# Patient Record
Sex: MALE | Race: BLACK OR AFRICAN AMERICAN | Employment: FULL TIME | URBAN - METROPOLITAN AREA
[De-identification: names, ages, dates, MRNs, and addresses within clinical notes are randomized per-mention and may not be internally consistent; named-entity substitution may affect disease eponyms.]

---

## 2019-04-09 ENCOUNTER — HOSPITAL ENCOUNTER (EMERGENCY)
Age: 59
Discharge: HOME | End: 2019-04-09
Attending: EMERGENCY MEDICINE
Payer: COMMERCIAL

## 2019-04-09 ENCOUNTER — APPOINTMENT (OUTPATIENT)
Dept: GENERAL RADIOLOGY | Age: 59
End: 2019-04-09
Attending: EMERGENCY MEDICINE
Payer: COMMERCIAL

## 2019-04-09 VITALS
OXYGEN SATURATION: 97 % | HEART RATE: 87 BPM | RESPIRATION RATE: 23 BRPM | SYSTOLIC BLOOD PRESSURE: 140 MMHG | TEMPERATURE: 98.9 F | DIASTOLIC BLOOD PRESSURE: 87 MMHG | WEIGHT: 176 LBS

## 2019-04-09 DIAGNOSIS — R05.9 COUGH: Primary | ICD-10-CM

## 2019-04-09 DIAGNOSIS — R52 GENERALIZED BODY ACHES: ICD-10-CM

## 2019-04-09 LAB
ANION GAP SERPL CALC-SCNC: 6 MMOL/L (ref 3–18)
ATRIAL RATE: 76 BPM
BASOPHILS # BLD: 0 K/UL (ref 0–0.1)
BASOPHILS NFR BLD: 0 % (ref 0–2)
BUN SERPL-MCNC: 19 MG/DL (ref 7–18)
BUN/CREAT SERPL: 16 (ref 12–20)
CALCIUM SERPL-MCNC: 9.7 MG/DL (ref 8.5–10.1)
CALCULATED P AXIS, ECG09: 62 DEGREES
CALCULATED R AXIS, ECG10: 43 DEGREES
CALCULATED T AXIS, ECG11: 148 DEGREES
CHLORIDE SERPL-SCNC: 105 MMOL/L (ref 100–108)
CO2 SERPL-SCNC: 27 MMOL/L (ref 21–32)
CREAT SERPL-MCNC: 1.18 MG/DL (ref 0.6–1.3)
DIAGNOSIS, 93000: NORMAL
DIFFERENTIAL METHOD BLD: ABNORMAL
EOSINOPHIL # BLD: 0 K/UL (ref 0–0.4)
EOSINOPHIL NFR BLD: 1 % (ref 0–5)
ERYTHROCYTE [DISTWIDTH] IN BLOOD BY AUTOMATED COUNT: 13.6 % (ref 11.6–14.5)
GLUCOSE SERPL-MCNC: 100 MG/DL (ref 74–99)
HCT VFR BLD AUTO: 45.3 % (ref 36–48)
HGB BLD-MCNC: 15.7 G/DL (ref 13–16)
LYMPHOCYTES # BLD: 2 K/UL (ref 0.9–3.6)
LYMPHOCYTES NFR BLD: 29 % (ref 21–52)
MCH RBC QN AUTO: 32 PG (ref 24–34)
MCHC RBC AUTO-ENTMCNC: 34.7 G/DL (ref 31–37)
MCV RBC AUTO: 92.3 FL (ref 74–97)
MONOCYTES # BLD: 0.9 K/UL (ref 0.05–1.2)
MONOCYTES NFR BLD: 12 % (ref 3–10)
NEUTS SEG # BLD: 4.2 K/UL (ref 1.8–8)
NEUTS SEG NFR BLD: 58 % (ref 40–73)
P-R INTERVAL, ECG05: 130 MS
PLATELET # BLD AUTO: 110 K/UL (ref 135–420)
PMV BLD AUTO: 10 FL (ref 9.2–11.8)
POTASSIUM SERPL-SCNC: 3.7 MMOL/L (ref 3.5–5.5)
Q-T INTERVAL, ECG07: 374 MS
QRS DURATION, ECG06: 100 MS
QTC CALCULATION (BEZET), ECG08: 420 MS
RBC # BLD AUTO: 4.91 M/UL (ref 4.7–5.5)
SODIUM SERPL-SCNC: 138 MMOL/L (ref 136–145)
TROPONIN I SERPL-MCNC: <0.02 NG/ML (ref 0–0.04)
VENTRICULAR RATE, ECG03: 76 BPM
WBC # BLD AUTO: 7.1 K/UL (ref 4.6–13.2)

## 2019-04-09 PROCEDURE — 84484 ASSAY OF TROPONIN QUANT: CPT

## 2019-04-09 PROCEDURE — 99285 EMERGENCY DEPT VISIT HI MDM: CPT

## 2019-04-09 PROCEDURE — 80048 BASIC METABOLIC PNL TOTAL CA: CPT

## 2019-04-09 PROCEDURE — 74011250637 HC RX REV CODE- 250/637: Performed by: EMERGENCY MEDICINE

## 2019-04-09 PROCEDURE — 85025 COMPLETE CBC W/AUTO DIFF WBC: CPT

## 2019-04-09 PROCEDURE — 93005 ELECTROCARDIOGRAM TRACING: CPT

## 2019-04-09 PROCEDURE — 71046 X-RAY EXAM CHEST 2 VIEWS: CPT

## 2019-04-09 RX ORDER — GUAIFENESIN 600 MG/1
600 TABLET, EXTENDED RELEASE ORAL 2 TIMES DAILY
Qty: 20 TAB | Refills: 0 | Status: SHIPPED | OUTPATIENT
Start: 2019-04-09 | End: 2021-02-05 | Stop reason: ALTCHOICE

## 2019-04-09 RX ORDER — IBUPROFEN 600 MG/1
600 TABLET ORAL
Status: COMPLETED | OUTPATIENT
Start: 2019-04-09 | End: 2019-04-09

## 2019-04-09 RX ORDER — CETIRIZINE HCL 10 MG
10 TABLET ORAL DAILY
Qty: 10 TAB | Refills: 0 | Status: SHIPPED | OUTPATIENT
Start: 2019-04-09 | End: 2021-02-05 | Stop reason: ALTCHOICE

## 2019-04-09 RX ORDER — ACETAMINOPHEN 500 MG
1000 TABLET ORAL
Qty: 40 TAB | Refills: 0 | Status: SHIPPED | OUTPATIENT
Start: 2019-04-09 | End: 2022-08-05 | Stop reason: ALTCHOICE

## 2019-04-09 RX ORDER — IBUPROFEN 600 MG/1
600 TABLET ORAL
Qty: 20 TAB | Refills: 0 | Status: ON HOLD | OUTPATIENT
Start: 2019-04-09 | End: 2021-01-14

## 2019-04-09 RX ORDER — ACETAMINOPHEN 500 MG
1000 TABLET ORAL
Status: COMPLETED | OUTPATIENT
Start: 2019-04-09 | End: 2019-04-09

## 2019-04-09 RX ADMIN — IBUPROFEN 600 MG: 600 TABLET ORAL at 02:58

## 2019-04-09 RX ADMIN — ACETAMINOPHEN 1000 MG: 500 TABLET, FILM COATED ORAL at 02:58

## 2019-04-09 NOTE — LETTER
NOTIFICATION RETURN TO WORK / SCHOOL 
 
4/9/2019 3:20 AM 
 
Mr. Luis Mckeon [address] [place] [address] [phone] To Whom It May Concern: 
 
Luis Mckeon is currently under the care of [facility] EMERGENCY DEPT. He will return to work/school on: 4/11/19 If there are questions or concerns please have the patient contact our office. Sincerely, Juan Leong MD

## 2019-04-09 NOTE — ED TRIAGE NOTES
Pt reports cough along with soreness in both arms, chest and back since Saturday.   Pt also reports chills

## 2019-04-09 NOTE — ED PROVIDER NOTES
Asif Treviño is a 60 y.o. Male with history of HIV and undetectable viral load with complaints of not feeling well for the last 2 days with cough congestion body aches. Patient states he has chest soreness mainly with cough. He has no exertional symptoms. There is no nausea vomiting or diarrhea. But does not have a known fever but is having chills and sweats. He denies any recent admissions or significant exposures. He has taken some over-the-counter medication with minimal relief. There is no hemoptysis    The history is provided by the patient. Past Medical History:   Diagnosis Date    HIV (human immunodeficiency virus infection) ([facility] [address]        No past surgical history on file. No family history on file.     Social History     Socioeconomic History    Marital status: SINGLE     Spouse name: Not on file    Number of children: Not on file    Years of education: Not on file    Highest education level: Not on file   Occupational History    Not on file   Social Needs    Financial resource strain: Not on file    Food insecurity:     Worry: Not on file     Inability: Not on file    Transportation needs:     Medical: Not on file     Non-medical: Not on file   Tobacco Use    Smoking status: Never Smoker   Substance and Sexual Activity    Alcohol use: Yes     Comment: socially    Drug use: Not on file    Sexual activity: Not on file   Lifestyle    Physical activity:     Days per week: Not on file     Minutes per session: Not on file    Stress: Not on file   Relationships    Social connections:     Talks on phone: Not on file     Gets together: Not on file     Attends Sikhism service: Not on file     Active member of club or organization: Not on file     Attends meetings of clubs or organizations: Not on file     Relationship status: Not on file    Intimate partner violence:     Fear of current or ex partner: Not on file     Emotionally abused: Not on file     Physically abused: Not on file Forced sexual activity: Not on file   Other Topics Concern    Not on file   Social History Narrative    Not on file         ALLERGIES: Patient has no known allergies. Review of Systems   Constitutional: Positive for chills and fatigue. Negative for fever. HENT: Positive for congestion. Negative for sore throat. Eyes: Negative for visual disturbance. Respiratory: Positive for cough. Cardiovascular: Negative for leg swelling. Gastrointestinal: Negative for abdominal pain. Endocrine: Negative for polyuria. Genitourinary: Negative for difficulty urinating. Musculoskeletal: Positive for myalgias. Negative for gait problem. Skin: Negative for rash. Allergic/Immunologic: Negative for immunocompromised state. Neurological: Negative for syncope. Psychiatric/Behavioral: Positive for sleep disturbance. Vitals:    04/09/19 0230 04/09/19 0245 04/09/19 0300   BP: 140/86 146/81 133/88   Pulse: 94 78 83   Resp: 23 22 23   Temp: 98.9 °F (37.2 °C)     SpO2: 98% 97% 97%   Weight: 79.8 kg (176 lb)              Physical Exam   Constitutional: He is oriented to person, place, and time. He appears well-developed and well-nourished. Non-toxic appearance. He does not have a sickly appearance. He does not appear ill. No distress. HENT:   Head: Normocephalic and atraumatic. Right Ear: External ear normal.   Left Ear: External ear normal.   Nose: Nose normal.   Mouth/Throat: Oropharynx is clear and moist. No oropharyngeal exudate. Eyes: Conjunctivae are normal.   Neck: Normal range of motion. Cardiovascular: Normal rate, regular rhythm, normal heart sounds and intact distal pulses. Pulmonary/Chest: Effort normal and breath sounds normal. No respiratory distress. He exhibits tenderness. Abdominal: Soft. There is no tenderness. Musculoskeletal: Normal range of motion. He exhibits no edema. Neurological: He is alert and oriented to person, place, and time. Skin: Skin is warm and dry.  He is not diaphoretic. Psychiatric: His behavior is normal.   Nursing note and vitals reviewed. MDM       Procedures    Vitals:  Patient Vitals for the past 12 hrs:   Temp Pulse Resp BP SpO2   04/09/19 0300 -- 83 23 133/88 97 %   04/09/19 0245 -- 78 22 146/81 97 %   04/09/19 0230 98.9 °F (37.2 °C) 94 23 140/86 98 %         Medications ordered:   Medications   acetaminophen (TYLENOL) tablet 1,000 mg (1,000 mg Oral Given 4/9/19 0258)   ibuprofen (MOTRIN) tablet 600 mg (600 mg Oral Given 4/9/19 0258)         Lab findings:  Recent Results (from the past 12 hour(s))   EKG, 12 LEAD, INITIAL    Collection Time: 04/09/19  2:42 AM   Result Value Ref Range    Ventricular Rate 76 BPM    Atrial Rate 76 BPM    P-R Interval 130 ms    QRS Duration 100 ms    Q-T Interval 374 ms    QTC Calculation (Bezet) 420 ms    Calculated P Axis 62 degrees    Calculated R Axis 43 degrees    Calculated T Axis 148 degrees    Diagnosis       Normal sinus rhythm  Possible Left atrial enlargement  Incomplete right bundle branch block  Left ventricular hypertrophy  T wave abnormality, consider inferolateral ischemia  Abnormal ECG  No previous ECGs available     CBC WITH AUTOMATED DIFF    Collection Time: 04/09/19  2:49 AM   Result Value Ref Range    WBC 7.1 4.6 - 13.2 K/uL    RBC 4.91 4.70 - 5.50 M/uL    HGB 15.7 13.0 - 16.0 g/dL    HCT 45.3 36.0 - 48.0 %    MCV 92.3 74.0 - 97.0 FL    MCH 32.0 24.0 - 34.0 PG    MCHC 34.7 31.0 - 37.0 g/dL    RDW 13.6 11.6 - 14.5 %    PLATELET 304 (L) 288 - 420 K/uL    MPV 10.0 9.2 - 11.8 FL    NEUTROPHILS 58 40 - 73 %    LYMPHOCYTES 29 21 - 52 %    MONOCYTES 12 (H) 3 - 10 %    EOSINOPHILS 1 0 - 5 %    BASOPHILS 0 0 - 2 %    ABS. NEUTROPHILS 4.2 1.8 - 8.0 K/UL    ABS. LYMPHOCYTES 2.0 0.9 - 3.6 K/UL    ABS. MONOCYTES 0.9 0.05 - 1.2 K/UL    ABS. EOSINOPHILS 0.0 0.0 - 0.4 K/UL    ABS.  BASOPHILS 0.0 0.0 - 0.1 K/UL    DF AUTOMATED     METABOLIC PANEL, BASIC    Collection Time: 04/09/19  2:49 AM   Result Value Ref Range Sodium 138 136 - 145 mmol/L    Potassium 3.7 3.5 - 5.5 mmol/L    Chloride 105 100 - 108 mmol/L    CO2 27 21 - 32 mmol/L    Anion gap 6 3.0 - 18 mmol/L    Glucose 100 (H) 74 - 99 mg/dL    BUN 19 (H) 7.0 - 18 MG/DL    Creatinine 1.18 0.6 - 1.3 MG/DL    BUN/Creatinine ratio 16 12 - 20      GFR est AA >60 >60 ml/min/1.73m2    GFR est non-AA >60 >60 ml/min/1.73m2    Calcium 9.7 8.5 - 10.1 MG/DL   TROPONIN I    Collection Time: 04/09/19  2:49 AM   Result Value Ref Range    Troponin-I, QT <0.02 0.0 - 0.045 NG/ML       EKG interpretation by ED Physician:  Normal sinus rhythm with incomplete right bundle branch block. Left ventricular hypertrophy. T wave abnormality in inferior leads. Rate 76    Not specifically different from other local facility    X-Ray, CT or other radiology findings or impressions:  XR CHEST PA LAT    (Results Pending)   nap per my interp    Progress notes, Consult notes or additional Procedure notes:   Most consistent with viral process. Do not feel patient requires other workup at this time  I have discussed with patient and/or family/sig other the results, interpretation of any imaging if performed, suspected diagnosis and treatment plan to include instructions regarding the diagnoses listed to which understanding was expressed with all questions answered      Reevaluation of patient:   stable    Disposition:  Diagnosis:   1. Cough    2.  Generalized body aches        Disposition: home    Follow-up Information     Follow up With Specialties Details Why Contact Samuel Mathis MD Internal Medicine Schedule an appointment as soon as possible for a visit  [address] EMERGENCY DEPT Emergency Medicine  If symptoms worsen [address] [place] [address]  [phone]            Patient's Medications   Start Taking    ACETAMINOPHEN (TYLENOL EXTRA STRENGTH) 500 MG TABLET    Take 2 Tabs by mouth every six (6) hours as needed for Pain or Fever. CETIRIZINE (ZYRTEC) 10 MG TABLET    Take 1 Tab by mouth daily. GUAIFENESIN ER (MUCINEX) 600 MG ER TABLET    Take 1 Tab by mouth two (2) times a day. IBUPROFEN (MOTRIN) 600 MG TABLET    Take 1 Tab by mouth every six (6) hours as needed for Pain. Continue Taking    ATAZANAVIR (REYATAZ) 300 MG CAPSULE    Take 600 mg by mouth Daily (before breakfast). EMTRICITABINE (EMTRIVA) 200 MG CAPSULE    Take 200 mg by mouth daily. ROSUVASTATIN (CRESTOR) 10 MG TABLET    Take 10 mg by mouth nightly. STAVUDINE PO    Take 50 mg by mouth two (2) times a day. These Medications have changed    No medications on file   Stop Taking    GUAIFENESIN-CODEINE (ROBITUSSIN AC) 100-10 MG/5 ML SOLUTION    Take 5 mL by mouth nightly as needed for Cough. Max Daily Amount: 5 mL.

## 2019-04-09 NOTE — DISCHARGE INSTRUCTIONS
Patient Education        Cough: Care Instructions  Your Care Instructions    A cough is your body's response to something that bothers your throat or airways. Many things can cause a cough. You might cough because of a cold or the flu, bronchitis, or asthma. Smoking, postnasal drip, allergies, and stomach acid that backs up into your throat also can cause coughs. A cough is a symptom, not a disease. Most coughs stop when the cause, such as a cold, goes away. You can take a few steps at home to cough less and feel better. Follow-up care is a key part of your treatment and safety. Be sure to make and go to all appointments, and call your doctor if you are having problems. It's also a good idea to know your test results and keep a list of the medicines you take. How can you care for yourself at home? · Drink lots of water and other fluids. This helps thin the mucus and soothes a dry or sore throat. Honey or lemon juice in hot water or tea may ease a dry cough. · Take cough medicine as directed by your doctor. · Prop up your head on pillows to help you breathe and ease a dry cough. · Try cough drops to soothe a dry or sore throat. Cough drops don't stop a cough. Medicine-flavored cough drops are no better than candy-flavored drops or hard candy. · Do not smoke. Avoid secondhand smoke. If you need help quitting, talk to your doctor about stop-smoking programs and medicines. These can increase your chances of quitting for good. When should you call for help? Call 911 anytime you think you may need emergency care.  For example, call if:    · You have severe trouble breathing.    Call your doctor now or seek immediate medical care if:    · You cough up blood.     · You have new or worse trouble breathing.     · You have a new or higher fever.     · You have a new rash.    Watch closely for changes in your health, and be sure to contact your doctor if:    · You cough more deeply or more often, especially if you notice more mucus or a change in the color of your mucus.     · You have new symptoms, such as a sore throat, an earache, or sinus pain.     · You do not get better as expected. Where can you learn more? Go to http://arsenio-mira.info/. Enter D279 in the search box to learn more about \"Cough: Care Instructions. \"  Current as of: September 5, 2018  Content Version: 11.9  © 9211-4059 [employer]. Care instructions adapted under license by [employer] (which disclaims liability or warranty for this information). If you have questions about a medical condition or this instruction, always ask your healthcare professional. [address] any warranty or liability for your use of this information.

## 2020-10-11 ENCOUNTER — HOSPITAL ENCOUNTER (EMERGENCY)
Age: 60
Discharge: HOME | End: 2020-10-11
Attending: EMERGENCY MEDICINE
Payer: COMMERCIAL

## 2020-10-11 VITALS
HEART RATE: 60 BPM | BODY MASS INDEX: 25.33 KG/M2 | SYSTOLIC BLOOD PRESSURE: 133 MMHG | HEIGHT: 69 IN | RESPIRATION RATE: 12 BRPM | OXYGEN SATURATION: 97 % | DIASTOLIC BLOOD PRESSURE: 83 MMHG | WEIGHT: 171 LBS | TEMPERATURE: 98.1 F

## 2020-10-11 DIAGNOSIS — S00.81XA ABRASION OF FOREHEAD, INITIAL ENCOUNTER: ICD-10-CM
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
## 2020-10-11 PROCEDURE — 74011000250 HC RX REV CODE- 250: Performed by: PHYSICIAN ASSISTANT

## 2020-10-11 PROCEDURE — 90715 TDAP VACCINE 7 YRS/> IM: CPT | Performed by: PHYSICIAN ASSISTANT

## 2020-10-11 PROCEDURE — 90471 IMMUNIZATION ADMIN: CPT

## 2020-10-11 PROCEDURE — 74011250636 HC RX REV CODE- 250/636: Performed by: PHYSICIAN ASSISTANT

## 2020-10-11 PROCEDURE — 99283 EMERGENCY DEPT VISIT LOW MDM: CPT

## 2020-10-11 RX ORDER — ASPIRIN 81 MG/1
TABLET ORAL DAILY
COMMUNITY

## 2020-10-11 RX ADMIN — TETANUS TOXOID, REDUCED DIPHTHERIA TOXOID AND ACELLULAR PERTUSSIS VACCINE, ADSORBED 0.5 ML: 5; 2.5; 8; 8; 2.5 SUSPENSION INTRAMUSCULAR at 20:07

## 2020-10-11 RX ADMIN — BACITRACIN, NEOMYCIN, POLYMYXIN B 1 PACKET: 400; 3.5; 5 OINTMENT TOPICAL at 20:07

## 2020-10-11 NOTE — ED TRIAGE NOTES
Ambulatory to triage. Patient states he slipped and fell down 2 steps outside 3 hours ago, hit the front of his head, right elbow and right knee. Arrives with 2 abrasions to his forehead. Denies LOC. \"I was just worried because of the swelling on my head. \"

## 2020-10-11 NOTE — ED PROVIDER NOTES
EMERGENCY DEPARTMENT HISTORY AND PHYSICAL EXAM    Date: 10/11/2020  Patient Name: Chalino Kirk    History of Presenting Illness     Chief Complaint   Patient presents with   [facility] Fall         History Provided By: patient        Additional History (Context): Chalino Kirk is a 9year-old male with history of HIV presenting to the emergency department with forehead abrasion he sustained prior to arrival.  Patient states he was pushing a family member in a wheelchair, lost his balance and fell forward. States he braces were head on what he thinks is the back of the wheelchair as he did not fully hit the ground. Patient did not lose consciousness, denies symptoms of headache, dizziness, neck pain, change in vision, dizziness, weakness, back pain or neck pain. Patient states he is in the ED as he is unsure when his last tetanus shot was. Patient states he is compliant with all HIV medications and his levels are undetectable. Patient denies anticoagulation or alcohol use today. PCP: Dinesh Lucas MD    Current Facility-Administered Medications   Medication Dose Route Frequency Provider Last Rate Last Dose    diph,Pertuss(AC),Tet Vac-PF (BOOSTRIX) suspension 0.5 mL  0.5 mL IntraMUSCular PRIOR TO DISCHARGE Jenelle Crespo PA-C        neomycin-bacitracnZn-polymyxnB (NEOSPORIN) ointment 1 Packet  1 Packet Topical NOW [place], [place]         Current Outpatient Medications   Medication Sig Dispense Refill    aspirin delayed-release 81 mg tablet Take  by mouth daily.  emtricitabine (EMTRIVA) 200 mg capsule Take 200 mg by mouth daily.  guaiFENesin ER (MUCINEX) 600 mg ER tablet Take 1 Tab by mouth two (2) times a day. 20 Tab 0    cetirizine (ZYRTEC) 10 mg tablet Take 1 Tab by mouth daily. 10 Tab 0    acetaminophen (TYLENOL EXTRA STRENGTH) 500 mg tablet Take 2 Tabs by mouth every six (6) hours as needed for Pain or Fever.  40 Tab 0    ibuprofen (MOTRIN) 600 mg tablet Take 1 Tab by mouth every six (6) hours as needed for Pain. 20 Tab 0    STAVUDINE PO Take 50 mg by mouth two (2) times a day.  atazanavir (REYATAZ) 300 mg capsule Take 600 mg by mouth Daily (before breakfast).  rosuvastatin (CRESTOR) 10 mg tablet Take 10 mg by mouth nightly. Past History     Past Medical History:  Past Medical History:   Diagnosis Date    High cholesterol     HIV (human immunodeficiency virus infection) ([facility] [address]        Past Surgical History:  History reviewed. No pertinent surgical history. Family History:  History reviewed. No pertinent family history. Social History:  Social History     Tobacco Use    Smoking status: Never Smoker   Substance Use Topics    Alcohol use: Yes     Comment: socially    Drug use: Not on file       Allergies:  No Known Allergies      Review of Systems       Review of Systems   Constitutional: Negative for chills and fever. HENT: Negative for nasal congestion, sore throat, rhinorrhea  Eyes: Negative. Respiratory: Negative for cough and negative for shortness of breath. Cardiovascular: Negative for chest pain and palpitations. Gastrointestinal: Negative for abdominal pain, constipation, diarrhea, nausea and vomiting. Genitourinary: Negative. Negative for difficulty urinating and flank pain. Musculoskeletal: Negative for back pain. Negative for gait problem and neck pain. Skin: pos for forehead abrasion. Allergic/Immunologic: Negative. Neurological: Negative for dizziness, weakness, numbness and headaches. Psychiatric/Behavioral: Negative. All other systems reviewed and are negative. All Other Systems Negative  Physical Exam     Vitals:    10/11/20 1939   BP: 133/83   Pulse: 60   Resp: 12   Temp: 98.1 °F (36.7 °C)   SpO2: 97%   Weight: 77.6 kg (171 lb)   Height: 5' 9\" (1.753 m)     Physical Exam  Vitals signs and nursing note reviewed. Constitutional:       General: He is not in acute distress. Appearance: Normal appearance. He is well-developed. He is not diaphoretic. HENT:      Head: Normocephalic. Abrasion present. No raccoon eyes, Zimmerman's sign, contusion, masses, right periorbital erythema, left periorbital erythema or laceration. Hair is normal.      Jaw: There is normal jaw occlusion. Nose: Nose normal.   Eyes:      Conjunctiva/sclera: Conjunctivae normal.      Pupils: Pupils are equal, round, and reactive to light. Neck:      Musculoskeletal: Normal range of motion and neck supple. No neck rigidity or muscular tenderness. Vascular: No carotid bruit. Cardiovascular:      Rate and Rhythm: Normal rate and regular rhythm. Pulmonary:      Effort: Pulmonary effort is normal. No respiratory distress. Breath sounds: Normal breath sounds. Abdominal:      Palpations: Abdomen is soft. Musculoskeletal: Normal range of motion. Skin:     General: Skin is warm. Findings: No rash. Neurological:      Mental Status: He is alert and oriented to person, place, and time. Cranial Nerves: No cranial nerve deficit. Coordination: Coordination normal.   Psychiatric:         Behavior: Behavior normal.           Diagnostic Study Results     Labs -   No results found for this or any previous visit (from the past 12 hour(s)). Radiologic Studies -   No orders to display     CT Results  (Last 48 hours)    None        CXR Results  (Last 48 hours)    None            Medical Decision Making   I am the first provider for this patient. I reviewed the vital signs, available nursing notes, past medical history, past surgical history, family history and social history. Vital Signs-Reviewed the patient's vital signs. Records Reviewed: Nursing notes, old medical records and any previous labs, imaging, visits, consultations pertinent to patient care    Procedures:  Procedures      ED Course: Progress Notes, Reevaluation, and Consults:      Provider Notes (Medical Decision Making): Wound cleaned thoroughly by ED nurse.  abx ointment applied. No LOC, focal deficits noted to warrant CT of brain. No c-spine tenderness, FROM. Will update tetanus, recommend ABX ointment and d/c home    MED RECONCILIATION:  Current Facility-Administered Medications   Medication Dose Route Frequency    diph,Pertuss(AC),Tet Vac-PF (BOOSTRIX) suspension 0.5 mL  0.5 mL IntraMUSCular PRIOR TO DISCHARGE    neomycin-bacitracnZn-polymyxnB (NEOSPORIN) ointment 1 Packet  1 Packet Topical NOW     Current Outpatient Medications   Medication Sig    aspirin delayed-release 81 mg tablet Take  by mouth daily.  emtricitabine (EMTRIVA) 200 mg capsule Take 200 mg by mouth daily.  guaiFENesin ER (MUCINEX) 600 mg ER tablet Take 1 Tab by mouth two (2) times a day.  cetirizine (ZYRTEC) 10 mg tablet Take 1 Tab by mouth daily.  acetaminophen (TYLENOL EXTRA STRENGTH) 500 mg tablet Take 2 Tabs by mouth every six (6) hours as needed for Pain or Fever.  ibuprofen (MOTRIN) 600 mg tablet Take 1 Tab by mouth every six (6) hours as needed for Pain.  STAVUDINE PO Take 50 mg by mouth two (2) times a day.  atazanavir (REYATAZ) 300 mg capsule Take 600 mg by mouth Daily (before breakfast).  rosuvastatin (CRESTOR) 10 mg tablet Take 10 mg by mouth nightly. Disposition:  home    DISCHARGE NOTE:     Pt has been reexamined. Patient has no new complaints, changes, or physical findings. Care plan outlined and precautions discussed. Discussed proper way to take medications. Discussed treatment plan, return precautions, symptomatic relief, and expected time to improvement. All questions answered. Patient is stable for discharge and outpatient management. Patient is ready to go home.     Follow-up Information     Follow up With Specialties Details Why Contact Info    Kamila Kraft MD Internal Medicine   [address] [facility] [address] [phone]      [facility] EMERGENCY DEPT Emergency Medicine   [address]  [address] [place] [address]  [phone] Current Discharge Medication List                Diagnosis     Clinical Impression:   1. Fall, initial encounter    2. Abrasion of forehead, initial encounter            Dictation disclaimer:  Please note that this dictation was completed with [employer], the computer voice recognition software. Quite often unanticipated grammatical, syntax, homophones, and other interpretive errors are inadvertently transcribed by the computer software. Please disregard these errors. Please excuse any errors that have escaped final proofreading.

## 2020-10-12 NOTE — ED NOTES
Abrasions cleaned with saline and shur-cleanse. Bacitracin and sterile non-adhesive applied. Patient tolerated well. Lowell Moralez

## 2021-01-13 ENCOUNTER — HOSPITAL ENCOUNTER (INPATIENT)
Age: 61
LOS: 7 days | Discharge: HOME | DRG: 371 | End: 2021-01-20
Attending: EMERGENCY MEDICINE | Admitting: INTERNAL MEDICINE
Payer: COMMERCIAL

## 2021-01-13 ENCOUNTER — APPOINTMENT (OUTPATIENT)
Dept: GENERAL RADIOLOGY | Age: 61
DRG: 371 | End: 2021-01-13
Attending: HOSPITALIST
Payer: COMMERCIAL

## 2021-01-13 ENCOUNTER — APPOINTMENT (OUTPATIENT)
Dept: CT IMAGING | Age: 61
DRG: 371 | End: 2021-01-13
Attending: PHYSICIAN ASSISTANT
Payer: COMMERCIAL

## 2021-01-13 DIAGNOSIS — M79.2 NEUROPATHIC PAIN: ICD-10-CM

## 2021-01-13 DIAGNOSIS — M62.82 NON-TRAUMATIC RHABDOMYOLYSIS: ICD-10-CM

## 2021-01-13 DIAGNOSIS — N17.9 ACUTE RENAL FAILURE, UNSPECIFIED ACUTE RENAL FAILURE TYPE (HCC): Primary | ICD-10-CM

## 2021-01-13 DIAGNOSIS — R77.8 ELEVATED TROPONIN: ICD-10-CM

## 2021-01-13 LAB
AMORPH CRY URNS QL MICRO: ABNORMAL
ANION GAP SERPL CALC-SCNC: 8 MMOL/L (ref 3–18)
APPEARANCE UR: ABNORMAL
BACTERIA URNS QL MICRO: NEGATIVE /HPF
BASOPHILS # BLD: 0 K/UL (ref 0–0.1)
BASOPHILS NFR BLD: 0 % (ref 0–2)
BILIRUB UR QL: NEGATIVE
BUN SERPL-MCNC: 35 MG/DL (ref 7–18)
BUN/CREAT SERPL: 15 (ref 12–20)
CALCIUM SERPL-MCNC: 10.6 MG/DL (ref 8.5–10.1)
CHLORIDE SERPL-SCNC: 99 MMOL/L (ref 100–111)
CK MB CFR SERPL CALC: 0.1 % (ref 0–4)
CK MB SERPL-MCNC: 12.9 NG/ML (ref 5–25)
CK SERPL-CCNC: ABNORMAL U/L (ref 39–308)
CO2 SERPL-SCNC: 27 MMOL/L (ref 21–32)
COLOR UR: ABNORMAL
CREAT SERPL-MCNC: 2.41 MG/DL (ref 0.6–1.3)
DIFFERENTIAL METHOD BLD: ABNORMAL
EOSINOPHIL # BLD: 0 K/UL (ref 0–0.4)
EOSINOPHIL NFR BLD: 0 % (ref 0–5)
EPITH CASTS URNS QL MICRO: ABNORMAL /LPF (ref 0–5)
ERYTHROCYTE [DISTWIDTH] IN BLOOD BY AUTOMATED COUNT: 13.4 % (ref 11.6–14.5)
GLUCOSE SERPL-MCNC: 134 MG/DL (ref 74–99)
GLUCOSE UR STRIP.AUTO-MCNC: NEGATIVE MG/DL
GRAN CASTS URNS QL MICRO: ABNORMAL /LPF
HCT VFR BLD AUTO: 52.5 % (ref 36–48)
HGB BLD-MCNC: 18 G/DL (ref 13–16)
HGB UR QL STRIP: ABNORMAL
KETONES UR QL STRIP.AUTO: NEGATIVE MG/DL
LEUKOCYTE ESTERASE UR QL STRIP.AUTO: ABNORMAL
LYMPHOCYTES # BLD: 1.6 K/UL (ref 0.9–3.6)
LYMPHOCYTES NFR BLD: 31 % (ref 21–52)
MAGNESIUM SERPL-MCNC: 2 MG/DL (ref 1.6–2.6)
MCH RBC QN AUTO: 32.1 PG (ref 24–34)
MCHC RBC AUTO-ENTMCNC: 34.3 G/DL (ref 31–37)
MCV RBC AUTO: 93.6 FL (ref 74–97)
METAMYELOCYTES NFR BLD MANUAL: 3 %
MONOCYTES # BLD: 0.5 K/UL (ref 0.05–1.2)
MONOCYTES NFR BLD: 10 % (ref 3–10)
NEUTS BAND NFR BLD MANUAL: 15 %
NEUTS SEG # BLD: 2.1 K/UL (ref 1.8–8)
NEUTS SEG NFR BLD: 41 % (ref 40–73)
NITRITE UR QL STRIP.AUTO: NEGATIVE
PH UR STRIP: 5 [PH] (ref 5–8)
PHOSPHATE SERPL-MCNC: 3.2 MG/DL (ref 2.5–4.9)
PLATELET # BLD AUTO: 130 K/UL (ref 135–420)
PLATELET COMMENTS,PCOM: ABNORMAL
PMV BLD AUTO: 10.9 FL (ref 9.2–11.8)
POTASSIUM SERPL-SCNC: 3.4 MMOL/L (ref 3.5–5.5)
PROT UR STRIP-MCNC: 300 MG/DL
RBC # BLD AUTO: 5.61 M/UL (ref 4.7–5.5)
RBC #/AREA URNS HPF: ABNORMAL /HPF (ref 0–5)
RBC MORPH BLD: ABNORMAL
SODIUM SERPL-SCNC: 134 MMOL/L (ref 136–145)
SP GR UR REFRACTOMETRY: 1.01 (ref 1–1.03)
TROPONIN I SERPL-MCNC: 0.16 NG/ML (ref 0–0.04)
UROBILINOGEN UR QL STRIP.AUTO: 0.2 EU/DL (ref 0.2–1)
WBC # BLD AUTO: 5 K/UL (ref 4.6–13.2)
WBC URNS QL MICRO: ABNORMAL /HPF (ref 0–4)

## 2021-01-13 PROCEDURE — 71045 X-RAY EXAM CHEST 1 VIEW: CPT

## 2021-01-13 PROCEDURE — 82553 CREATINE MB FRACTION: CPT

## 2021-01-13 PROCEDURE — 74011250636 HC RX REV CODE- 250/636: Performed by: INTERNAL MEDICINE

## 2021-01-13 PROCEDURE — 74011250637 HC RX REV CODE- 250/637: Performed by: PHYSICIAN ASSISTANT

## 2021-01-13 PROCEDURE — 99283 EMERGENCY DEPT VISIT LOW MDM: CPT

## 2021-01-13 PROCEDURE — 74011250636 HC RX REV CODE- 250/636: Performed by: EMERGENCY MEDICINE

## 2021-01-13 PROCEDURE — 74011250637 HC RX REV CODE- 250/637: Performed by: INTERNAL MEDICINE

## 2021-01-13 PROCEDURE — 85025 COMPLETE CBC W/AUTO DIFF WBC: CPT

## 2021-01-13 PROCEDURE — 97165 OT EVAL LOW COMPLEX 30 MIN: CPT

## 2021-01-13 PROCEDURE — 2709999900 HC NON-CHARGEABLE SUPPLY

## 2021-01-13 PROCEDURE — 83735 ASSAY OF MAGNESIUM: CPT

## 2021-01-13 PROCEDURE — 87324 CLOSTRIDIUM AG IA: CPT

## 2021-01-13 PROCEDURE — 81001 URINALYSIS AUTO W/SCOPE: CPT

## 2021-01-13 PROCEDURE — 93005 ELECTROCARDIOGRAM TRACING: CPT

## 2021-01-13 PROCEDURE — 65270000029 HC RM PRIVATE

## 2021-01-13 PROCEDURE — 74011250636 HC RX REV CODE- 250/636: Performed by: PHYSICIAN ASSISTANT

## 2021-01-13 PROCEDURE — 74011000250 HC RX REV CODE- 250: Performed by: INTERNAL MEDICINE

## 2021-01-13 PROCEDURE — 87086 URINE CULTURE/COLONY COUNT: CPT

## 2021-01-13 PROCEDURE — 74176 CT ABD & PELVIS W/O CONTRAST: CPT

## 2021-01-13 PROCEDURE — 84100 ASSAY OF PHOSPHORUS: CPT

## 2021-01-13 PROCEDURE — 80048 BASIC METABOLIC PNL TOTAL CA: CPT

## 2021-01-13 RX ORDER — ENOXAPARIN SODIUM 100 MG/ML
40 INJECTION SUBCUTANEOUS DAILY
Status: DISCONTINUED | OUTPATIENT
Start: 2021-01-14 | End: 2021-01-14 | Stop reason: DRUGHIGH

## 2021-01-13 RX ORDER — ASPIRIN 81 MG/1
81 TABLET ORAL DAILY
Status: DISCONTINUED | OUTPATIENT
Start: 2021-01-14 | End: 2021-01-20 | Stop reason: HOSPADM

## 2021-01-13 RX ORDER — SODIUM CHLORIDE 0.9 % (FLUSH) 0.9 %
5-40 SYRINGE (ML) INJECTION EVERY 8 HOURS
Status: DISCONTINUED | OUTPATIENT
Start: 2021-01-13 | End: 2021-01-20 | Stop reason: HOSPADM

## 2021-01-13 RX ORDER — ACETAMINOPHEN 650 MG/1
650 SUPPOSITORY RECTAL
Status: DISCONTINUED | OUTPATIENT
Start: 2021-01-13 | End: 2021-01-20 | Stop reason: HOSPADM

## 2021-01-13 RX ORDER — ONDANSETRON 2 MG/ML
4 INJECTION INTRAMUSCULAR; INTRAVENOUS
Status: DISCONTINUED | OUTPATIENT
Start: 2021-01-13 | End: 2021-01-20 | Stop reason: HOSPADM

## 2021-01-13 RX ORDER — ATAZANAVIR 200 MG/1
600 CAPSULE ORAL
Status: DISCONTINUED | OUTPATIENT
Start: 2021-01-14 | End: 2021-01-14

## 2021-01-13 RX ORDER — POLYETHYLENE GLYCOL 3350 17 G/17G
17 POWDER, FOR SOLUTION ORAL DAILY PRN
Status: DISCONTINUED | OUTPATIENT
Start: 2021-01-13 | End: 2021-01-20 | Stop reason: HOSPADM

## 2021-01-13 RX ORDER — SODIUM CHLORIDE 0.9 % (FLUSH) 0.9 %
5-40 SYRINGE (ML) INJECTION AS NEEDED
Status: DISCONTINUED | OUTPATIENT
Start: 2021-01-13 | End: 2021-01-20 | Stop reason: HOSPADM

## 2021-01-13 RX ORDER — LOPERAMIDE HYDROCHLORIDE 2 MG/1
2 CAPSULE ORAL
Status: COMPLETED | OUTPATIENT
Start: 2021-01-13 | End: 2021-01-13

## 2021-01-13 RX ORDER — SODIUM CHLORIDE 9 MG/ML
1000 INJECTION, SOLUTION INTRAVENOUS CONTINUOUS
Status: DISPENSED | OUTPATIENT
Start: 2021-01-14 | End: 2021-01-14

## 2021-01-13 RX ORDER — LEVOFLOXACIN 5 MG/ML
500 INJECTION, SOLUTION INTRAVENOUS EVERY 24 HOURS
Status: DISCONTINUED | OUTPATIENT
Start: 2021-01-14 | End: 2021-01-14

## 2021-01-13 RX ORDER — ACETAMINOPHEN 325 MG/1
650 TABLET ORAL
Status: DISCONTINUED | OUTPATIENT
Start: 2021-01-13 | End: 2021-01-20 | Stop reason: HOSPADM

## 2021-01-13 RX ORDER — PROMETHAZINE HYDROCHLORIDE 25 MG/1
12.5 TABLET ORAL
Status: DISCONTINUED | OUTPATIENT
Start: 2021-01-13 | End: 2021-01-20 | Stop reason: HOSPADM

## 2021-01-13 RX ORDER — EMTRICITABINE 200 MG/1
200 CAPSULE ORAL DAILY
Status: DISCONTINUED | OUTPATIENT
Start: 2021-01-14 | End: 2021-01-14

## 2021-01-13 RX ADMIN — ACETAMINOPHEN 650 MG: 325 TABLET, FILM COATED ORAL at 23:12

## 2021-01-13 RX ADMIN — SODIUM CHLORIDE 1000 ML: 900 INJECTION, SOLUTION INTRAVENOUS at 17:29

## 2021-01-13 RX ADMIN — LOPERAMIDE HYDROCHLORIDE 2 MG: 2 CAPSULE ORAL at 17:28

## 2021-01-13 RX ADMIN — Medication 10 ML: at 21:02

## 2021-01-13 RX ADMIN — SODIUM BICARBONATE: 84 INJECTION, SOLUTION INTRAVENOUS at 20:02

## 2021-01-13 NOTE — H&P
Hospitalist Admission Note    NAME: Lonnie Smith   :  1960   MRN:  [id number]     Date/Time:  2021 6:27 PM    Patient PCP: Howard Garcia MD  ______________________________________________________________________  Given the patient's current clinical presentation, I have a high level of concern for decompensation if discharged from the emergency department. Complex decision making was performed, which includes reviewing the patient's available past medical records, laboratory results, and x-ray films. My assessment of this patient's clinical condition and my plan of care is as follows. Assessment / Plan:    Rhadomyolysis  - unclear cause at this time, severe diarrhea and dehydration  - admit, for aggressive ivf to prevent worsening deonte  - start bicarb drip to prevent pigment nephropathy   - trend cpk daily    Acute Kidney Injury  - fluids as above, avoid nephrotoxins  - trend cr  - if not improving by am, nephrology consult    HIV  - says well controlled  - cont haart    Diarrhea  - check for cdif  - if neg can use imodium  - ivf to replace losses, watch electrolytes      Subjective:   CHIEF COMPLAINT: diarrhea and weakness    HISTORY OF PRESENT ILLNESS:     Lonnie Smith is a 63 y.o.  male who presents with diarrhea and weakness. He has a hx hiv with compliance with haart. Was in usual state of health until Monday evening, started diarrhea, watery, severe. Pt has continued with this for last 2 days, also occasional vomiting, thought this was food poisoning. He is found to have rhabdo in ed, with deonte. Pt denies being down on floor, denies alcoholism, no new meds, no injury/trauma, no harsh workouts or marathons, no cocaine. He continues to have diarrhea. We were asked to admit for work up and evaluation of the above problems.      Past Medical History:   Diagnosis Date    High cholesterol     HIV (human immunodeficiency virus infection) ([address]         History reviewed. No pertinent surgical history. Social History     Tobacco Use    Smoking status: Never Smoker   Substance Use Topics    Alcohol use: Yes     Comment: socially        History reviewed. No pertinent family history. No Known Allergies     Prior to Admission medications    Medication Sig Start Date End Date Taking? Authorizing Provider   aspirin delayed-release 81 mg tablet Take  by mouth daily. Danielito Pierce MD   guaiFENesin ER (MUCINEX) 600 mg ER tablet Take 1 Tab by mouth two (2) times a day. 4/9/19   Prince Santiago MD   cetirizine (ZYRTEC) 10 mg tablet Take 1 Tab by mouth daily. 4/9/19   Prince Santiago MD   acetaminophen (TYLENOL EXTRA STRENGTH) 500 mg tablet Take 2 Tabs by mouth every six (6) hours as needed for Pain or Fever. 4/9/19   Prince Santiago MD   ibuprofen (MOTRIN) 600 mg tablet Take 1 Tab by mouth every six (6) hours as needed for Pain. 4/9/19   Prince Santiago MD   STAVUDINE PO Take 50 mg by mouth two (2) times a day. Danielito Pierce MD   emtricitabine (EMTRIVA) 200 mg capsule Take 200 mg by mouth daily. Danielito Pierce MD   atazanavir (REYATAZ) 300 mg capsule Take 600 mg by mouth Daily (before breakfast). Danielito Pierce MD   rosuvastatin (CRESTOR) 10 mg tablet Take 10 mg by mouth nightly. Danielito Pierce MD       REVIEW OF SYSTEMS:     I am not able to complete the review of systems because:    The patient is intubated and sedated    The patient has altered mental status due to his acute medical problems    The patient has baseline aphasia from prior stroke(s)    The patient has baseline dementia and is not reliable historian    The patient is in acute medical distress and unable to provide information           Total of 12 systems reviewed as follows:       POSITIVE= underlined text  Negative = text not underlined  General:  generalized weakness,  Eyes:    blurred vision, eye pain, loss of vision, double vision  ENT:    rhinorrhea, pharyngitis   Respiratory:   cough, sputum production, SOB, REED, wheezing, pleuritic pain   Cardiology:   chest pain, palpitations, orthopnea, PND, edema, syncope   Gastrointestinal:  Diarrhea, vomiting  Genitourinary:  frequency, urgency, dysuria, hematuria, incontinence   Muskuloskeletal :  arthralgia, myalgia, back pain  Hematology:  easy bruising, nose or gum bleeding, lymphadenopathy   Dermatological: rash, ulceration, pruritis, color change / jaundice  Endocrine:   hot flashes or polydipsia   Neurological:  headache, dizziness, confusion, focal weakness, paresthesia,     Speech difficulties, memory loss, gait difficulty  Psychological: Feelings of anxiety, depression, agitation    Objective:   VITALS:    Visit Vitals  BP (!) 119/90   Pulse 100   Temp 98 °F (36.7 °C)   Resp 18   SpO2 100%       PHYSICAL EXAM:    General:    Alert, cooperative, mild distress, appears stated age. HEENT: Atraumatic, anicteric sclerae, pink conjunctivae     No oral ulcers, mucosa moist, throat clear, dentition fair  Neck:  Supple, symmetrical,  thyroid: non tender  Lungs:   Clear to auscultation bilaterally. No Wheezing or Rhonchi. No rales. Chest wall:  No tenderness  No Accessory muscle use. Heart:   Regular  rhythm,  No  murmur   No edema  Abdomen:   Soft, non-tender. Not distended. Bowel sounds normal  Extremities: No cyanosis. No clubbing,  No muscle tenderness    Skin turgor normal, Capillary refill normal, Radial dial pulse 2+  Skin:     Not pale. Not Jaundiced  No rashes   Psych:  Good insight. Not depressed. Not anxious or agitated. Neurologic: EOMs intact. No facial asymmetry. No aphasia or slurred speech. Symmetrical strength, Sensation grossly intact.  Alert and oriented X 4.     ecg reviewed, with little change from prior, t waves remain inverted, rBBB.  _______________________________________________________________________  Care Plan discussed with:    Comments   Patient x    Family      RN     Care Manager                    Consultant: _______________________________________________________________________  Expected  Disposition:   Home with Family x   HH/PT/OT/RN    SNF/LTC    MARIA ELENA    ________________________________________________________________________  TOTAL TIME:  35 Minutes    Critical Care Provided     Minutes non procedure based      Comments     Reviewed previous records   >50% of visit spent in counseling and coordination of care  Discussion with patient and/or family and questions answered       ________________________________________________________________________  Signed: Jeffy Stokes MD    Procedures: see electronic medical records for all procedures/Xrays and details which were not copied into this note but were reviewed prior to creation of Plan. LAB DATA REVIEWED:    Recent Results (from the past 24 hour(s))   CBC WITH AUTOMATED DIFF    Collection Time: 01/13/21 12:30 PM   Result Value Ref Range    WBC 5.0 4.6 - 13.2 K/uL    RBC 5.61 (H) 4.70 - 5.50 M/uL    HGB 18.0 (H) 13.0 - 16.0 g/dL    HCT 52.5 (H) 36.0 - 48.0 %    MCV 93.6 74.0 - 97.0 FL    MCH 32.1 24.0 - 34.0 PG    MCHC 34.3 31.0 - 37.0 g/dL    RDW 13.4 11.6 - 14.5 %    PLATELET 333 (L) 566 - 420 K/uL    MPV 10.9 9.2 - 11.8 FL    NEUTROPHILS PENDING %    LYMPHOCYTES PENDING %    MONOCYTES PENDING %    EOSINOPHILS PENDING %    BASOPHILS PENDING %    ABS. NEUTROPHILS PENDING K/UL    ABS. LYMPHOCYTES PENDING K/UL    ABS. MONOCYTES PENDING K/UL    ABS. EOSINOPHILS PENDING K/UL    ABS.  BASOPHILS PENDING K/UL    DF PENDING    METABOLIC PANEL, BASIC    Collection Time: 01/13/21 12:30 PM   Result Value Ref Range    Sodium 134 (L) 136 - 145 mmol/L    Potassium 3.4 (L) 3.5 - 5.5 mmol/L    Chloride 99 (L) 100 - 111 mmol/L    CO2 27 21 - 32 mmol/L    Anion gap 8 3.0 - 18 mmol/L    Glucose 134 (H) 74 - 99 mg/dL    BUN 35 (H) 7.0 - 18 MG/DL    Creatinine 2.41 (H) 0.6 - 1.3 MG/DL    BUN/Creatinine ratio 15 12 - 20      GFR est AA 33 (L) >60 ml/min/1.73m2    GFR est non-AA 28 (L) >60 ml/min/1.73m2    Calcium 10.6 (H) 8.5 - 10.1 MG/DL   CARDIAC PANEL,(CK, CKMB & TROPONIN)    Collection Time: 01/13/21 12:30 PM   Result Value Ref Range    CK - MB 12.9 (H) <3.6 ng/ml    CK-MB Index 0.1 0.0 - 4.0 %    CK 13,354 (H) 39 - 308 U/L    Troponin-I, QT 0.16 (H) 0.0 - 0.045 NG/ML   MAGNESIUM    Collection Time: 01/13/21 12:30 PM   Result Value Ref Range    Magnesium 2.0 1.6 - 2.6 mg/dL   PHOSPHORUS    Collection Time: 01/13/21 12:30 PM   Result Value Ref Range    Phosphorus 3.2 2.5 - 4.9 MG/DL   EKG, 12 LEAD, INITIAL    Collection Time: 01/13/21  4:48 PM   Result Value Ref Range    Ventricular Rate 117 BPM    Atrial Rate 117 BPM    P-R Interval 122 ms    QRS Duration 100 ms    Q-T Interval 324 ms    QTC Calculation (Bezet) 451 ms    Calculated P Axis 64 degrees    Calculated R Axis 28 degrees    Calculated T Axis 121 degrees    Diagnosis       Critical Test Result: STEMI  Sinus tachycardia  Possible Left atrial enlargement  Incomplete right bundle branch block  Left ventricular hypertrophy with repolarization abnormality ( R in aVL ,   Sokolow-Kraus )  ST elevation, consider inferior injury or acute infarct  * ACUTE MI / STEMI *  Consider right ventricular involvement in acute inferior infarct  Abnormal ECG  When compared with ECG of 09-APR-2019 02:42,  Vent.  rate has increased BY  41 BPM  ST no longer elevated in Anterior leads  Nonspecific T wave abnormality has replaced inverted T waves in Inferior   leads  T wave inversion more evident in Anterior leads

## 2021-01-14 PROBLEM — R19.7 DIARRHEA: Status: ACTIVE | Noted: 2021-01-14

## 2021-01-14 PROBLEM — B20 HIV (HUMAN IMMUNODEFICIENCY VIRUS INFECTION) (HCC): Status: ACTIVE | Noted: 2021-01-14

## 2021-01-14 PROBLEM — N17.9 AKI (ACUTE KIDNEY INJURY) (HCC): Status: ACTIVE | Noted: 2021-01-14

## 2021-01-14 LAB
ANION GAP SERPL CALC-SCNC: 10 MMOL/L (ref 3–18)
BASOPHILS # BLD: 0 K/UL (ref 0–0.1)
BASOPHILS NFR BLD: 0 % (ref 0–3)
BUN SERPL-MCNC: 47 MG/DL (ref 7–18)
BUN/CREAT SERPL: 14 (ref 12–20)
C DIFF GDH STL QL: NEGATIVE
C DIFF TOX A+B STL QL IA: NEGATIVE
CALCIUM SERPL-MCNC: 8.7 MG/DL (ref 8.5–10.1)
CHLORIDE SERPL-SCNC: 102 MMOL/L (ref 100–111)
CK SERPL-CCNC: 9360 U/L (ref 39–308)
CO2 SERPL-SCNC: 25 MMOL/L (ref 21–32)
CREAT SERPL-MCNC: 3.43 MG/DL (ref 0.6–1.3)
DIFFERENTIAL METHOD BLD: ABNORMAL
EOSINOPHIL # BLD: 0 K/UL (ref 0–0.4)
EOSINOPHIL NFR BLD: 0 % (ref 0–5)
ERYTHROCYTE [DISTWIDTH] IN BLOOD BY AUTOMATED COUNT: 13 % (ref 11.6–14.5)
GLUCOSE SERPL-MCNC: 115 MG/DL (ref 74–99)
HCT VFR BLD AUTO: 41.6 % (ref 36–48)
HGB BLD-MCNC: 14.4 G/DL (ref 13–16)
INTERPRETATION: NORMAL
LYMPHOCYTES # BLD: 1 K/UL (ref 0.8–3.5)
LYMPHOCYTES NFR BLD: 38 % (ref 20–51)
MAGNESIUM SERPL-MCNC: 1.8 MG/DL (ref 1.6–2.6)
MCH RBC QN AUTO: 31.5 PG (ref 24–34)
MCHC RBC AUTO-ENTMCNC: 34.6 G/DL (ref 31–37)
MCV RBC AUTO: 91 FL (ref 74–97)
METAMYELOCYTES NFR BLD MANUAL: 3 %
MONOCYTES # BLD: 0.4 K/UL (ref 0–1)
MONOCYTES NFR BLD: 14 % (ref 2–9)
MYELOCYTES NFR BLD MANUAL: 3 %
NEUTS SEG # BLD: 1.1 K/UL (ref 1.8–8)
NEUTS SEG NFR BLD: 42 % (ref 42–75)
PHOSPHATE SERPL-MCNC: 2.1 MG/DL (ref 2.5–4.9)
PLATELET # BLD AUTO: 118 K/UL (ref 135–420)
PLATELET COMMENTS,PCOM: ABNORMAL
PMV BLD AUTO: 10.9 FL (ref 9.2–11.8)
POTASSIUM SERPL-SCNC: 3.2 MMOL/L (ref 3.5–5.5)
RBC # BLD AUTO: 4.57 M/UL (ref 4.7–5.5)
RBC MORPH BLD: ABNORMAL
SODIUM SERPL-SCNC: 137 MMOL/L (ref 136–145)
WBC # BLD AUTO: 2.5 K/UL (ref 4.6–13.2)

## 2021-01-14 PROCEDURE — 65270000029 HC RM PRIVATE

## 2021-01-14 PROCEDURE — 84100 ASSAY OF PHOSPHORUS: CPT

## 2021-01-14 PROCEDURE — 74011000250 HC RX REV CODE- 250: Performed by: INTERNAL MEDICINE

## 2021-01-14 PROCEDURE — 97167 OT EVAL HIGH COMPLEX 60 MIN: CPT

## 2021-01-14 PROCEDURE — 87077 CULTURE AEROBIC IDENTIFY: CPT

## 2021-01-14 PROCEDURE — 74011250636 HC RX REV CODE- 250/636: Performed by: HOSPITALIST

## 2021-01-14 PROCEDURE — 74011000258 HC RX REV CODE- 258: Performed by: HOSPITALIST

## 2021-01-14 PROCEDURE — 36415 COLL VENOUS BLD VENIPUNCTURE: CPT

## 2021-01-14 PROCEDURE — 87040 BLOOD CULTURE FOR BACTERIA: CPT

## 2021-01-14 PROCEDURE — 87186 SC STD MICRODIL/AGAR DIL: CPT

## 2021-01-14 PROCEDURE — 2709999900 HC NON-CHARGEABLE SUPPLY

## 2021-01-14 PROCEDURE — 85025 COMPLETE CBC W/AUTO DIFF WBC: CPT

## 2021-01-14 PROCEDURE — 87177 OVA AND PARASITES SMEARS: CPT

## 2021-01-14 PROCEDURE — 74011250637 HC RX REV CODE- 250/637: Performed by: INTERNAL MEDICINE

## 2021-01-14 PROCEDURE — 87506 IADNA-DNA/RNA PROBE TQ 6-11: CPT

## 2021-01-14 PROCEDURE — 74011250637 HC RX REV CODE- 250/637: Performed by: PHYSICIAN ASSISTANT

## 2021-01-14 PROCEDURE — 83735 ASSAY OF MAGNESIUM: CPT

## 2021-01-14 PROCEDURE — 74011250636 HC RX REV CODE- 250/636: Performed by: INTERNAL MEDICINE

## 2021-01-14 PROCEDURE — 82550 ASSAY OF CK (CPK): CPT

## 2021-01-14 PROCEDURE — 80048 BASIC METABOLIC PNL TOTAL CA: CPT

## 2021-01-14 RX ORDER — ENOXAPARIN SODIUM 100 MG/ML
30 INJECTION SUBCUTANEOUS EVERY 24 HOURS
Status: DISCONTINUED | OUTPATIENT
Start: 2021-01-15 | End: 2021-01-20 | Stop reason: HOSPADM

## 2021-01-14 RX ORDER — LEVOFLOXACIN 5 MG/ML
500 INJECTION, SOLUTION INTRAVENOUS
Status: DISCONTINUED | OUTPATIENT
Start: 2021-01-16 | End: 2021-01-16

## 2021-01-14 RX ORDER — ELVITEGRAVIR, COBICISTAT, EMTRICITABINE, AND TENOFOVIR ALAFENAMIDE 150; 150; 200; 10 MG/1; MG/1; MG/1; MG/1
1 TABLET ORAL
COMMUNITY
End: 2021-01-20

## 2021-01-14 RX ORDER — LISINOPRIL 20 MG/1
20 TABLET ORAL DAILY
COMMUNITY
End: 2021-01-20

## 2021-01-14 RX ORDER — POTASSIUM CHLORIDE 20 MEQ/1
40 TABLET, EXTENDED RELEASE ORAL ONCE
Status: COMPLETED | OUTPATIENT
Start: 2021-01-14 | End: 2021-01-14

## 2021-01-14 RX ADMIN — SODIUM BICARBONATE: 84 INJECTION, SOLUTION INTRAVENOUS at 02:58

## 2021-01-14 RX ADMIN — PIPERACILLIN AND TAZOBACTAM 3.38 G: 3; .375 INJECTION, POWDER, LYOPHILIZED, FOR SOLUTION INTRAVENOUS at 11:11

## 2021-01-14 RX ADMIN — Medication 10 ML: at 05:36

## 2021-01-14 RX ADMIN — Medication 10 ML: at 15:48

## 2021-01-14 RX ADMIN — LEVOFLOXACIN 500 MG: 5 INJECTION, SOLUTION INTRAVENOUS at 00:56

## 2021-01-14 RX ADMIN — PIPERACILLIN AND TAZOBACTAM 2.25 G: 2; .25 INJECTION, POWDER, LYOPHILIZED, FOR SOLUTION INTRAVENOUS at 05:35

## 2021-01-14 RX ADMIN — POTASSIUM CHLORIDE 40 MEQ: 1500 TABLET, EXTENDED RELEASE ORAL at 11:11

## 2021-01-14 RX ADMIN — SODIUM BICARBONATE: 84 INJECTION, SOLUTION INTRAVENOUS at 19:59

## 2021-01-14 RX ADMIN — ASPIRIN 81 MG: 81 TABLET, COATED ORAL at 09:41

## 2021-01-14 RX ADMIN — PIPERACILLIN AND TAZOBACTAM 2.25 G: 2; .25 INJECTION, POWDER, LYOPHILIZED, FOR SOLUTION INTRAVENOUS at 00:36

## 2021-01-14 RX ADMIN — Medication 10 ML: at 21:47

## 2021-01-14 RX ADMIN — PIPERACILLIN AND TAZOBACTAM 3.38 G: 3; .375 INJECTION, POWDER, LYOPHILIZED, FOR SOLUTION INTRAVENOUS at 21:47

## 2021-01-14 RX ADMIN — SODIUM BICARBONATE: 84 INJECTION, SOLUTION INTRAVENOUS at 11:12

## 2021-01-14 RX ADMIN — ENOXAPARIN SODIUM 40 MG: 40 INJECTION SUBCUTANEOUS at 09:41

## 2021-01-14 NOTE — PROGRESS NOTES
Problem: Discharge Planning  Goal: *Discharge to safe environment  Outcome: Progressing Towards Goal   Plan home     Reason for Admission:   Fever tachycardia                   RUR Score:   9%                  Plan for utilizing home health:   no       PCP: First and Last name:  Dawson mario   Name of Practice:    Are you a current patient: Yes/No: yes   Approximate date of last visit: nov 2020   Can you participate in a virtual visit with your PCP: yes                    Current Advanced Directive/Advance Care Plan: no does not want one                         Transition of Care Plan:  Spoke with pt,lives alone. Independent with adls, amb.   Demographics correct  No dme  drove self to hospital plans to drive self home.        Designates his niece for dcp      Patient has designated ______________niece__________ to participate in his/her discharge plan and to receive any needed information.     Name: patricia hogue  Address: [place]  Phone number: [phone]    Care Management Interventions  PCP Verified by CM: Yes  Palliative Care Criteria Met (RRAT>21 & CHF Dx)?: No  Mode of Transport at Discharge: Other (see comment)  Transition of Care Consult (CM Consult): Discharge Planning  Discharge Durable Medical Equipment: No  Physical Therapy Consult: No  Occupational Therapy Consult: No  Speech Therapy Consult: No  Confirm Follow Up Transport: Self  The Patient and/or Patient Representative was Provided with a Choice of Provider and Agrees with the Discharge Plan?: No  Freedom of Choice List was Provided with Basic Dialogue that Supports the Patient's Individualized Plan of Care/Goals, Treatment Preferences and Shares the Quality Data Associated with the Providers?: No  Discharge Location  Discharge Placement: Home

## 2021-01-14 NOTE — ROUTINE PROCESS
0720  -- Bedside, Verbal and Written shift change report received by [facility] [employer] (oncoming nurse) by [employer] nurse). Allergy band placed on pt's wrist. Report included the following information SBAR, Kardex, Intake/Output, MAR and Recent Results. 0825-Assessment completed, call bell within reach, no distress noted. 3589  -- PM  medications administered, pt tolerated with ease, will continue to monitor. 1200 -- Shift reassessment, pt condition unchanged, will continue to monitor. 1600 --  Shift reassessment, pt condition unchanged, will continue to monitor. 1800-stools samples taken to lab. 1945-- Bedside, Verbal and Written shift change report given to Nahid(oncoming nurse) by [facility] [employer] (offgoing nurse). Report included the following information SBAR, Kardex, Intake/Output, MAR and Recent Results. Skin assessment completed.

## 2021-01-14 NOTE — PROGRESS NOTES
Internal Medicine Progress Note    Patient's Name: Alejandra Riojas  Admit Date: 1/13/2021  Length of Stay: 1      Assessment/Plan     Principal Problem:    Rhabdomyolysis (1/13/2021)    Active Problems:    YOLIE (acute kidney injury) ([facility] [address] (1/14/2021)      HIV (human immunodeficiency virus infection) ([facility] [address] (1/14/2021)      Diarrhea (1/14/2021)      Rhabdo/YOLIE  - aggressive IV fluids  - monitor BMP  - CK trending down  - continue to monitor BMP. Consult nephro tomorrow if continues to worsen significantly    Diarrhea  - C diff neg  - check O&P, enteric bacteria panel    HIV  - continue HAART    Hypokalemia  - 2/2 diarrhea  - replace    - Cont acceptable home medications for chronic conditions   - DVT protocol    I have personally reviewed all pertinent labs and films that have officially resulted over the last 24 hours. I have personally checked for all pending labs that are awaiting final results. Anticipated discharge: >2 days    HPI     Alejandra Riojas is a 63 y.o.  male who presents with diarrhea and weakness. He has a hx hiv with compliance with haart. Was in usual state of health until Monday evening, started diarrhea, watery, severe. Pt has continued with this for last 2 days, also occasional vomiting, thought this was food poisoning. He is found to have rhabdo in ed, with yolie. Pt denies being down on floor, denies alcoholism, no new meds, no injury/trauma, no harsh workouts or marathons, no cocaine. He continues to have diarrhea. Hospital Course     He was started on aggressive IVF. C diff negative. Subjective     Pt s/e @ bedside. No major events overnight. Pt says diarrhea continues. Denies myalgias.     Objective     Visit Vitals  BP 95/62 (BP 1 Location: Left arm, BP Patient Position: At rest)   Pulse 79   Temp 98.6 °F (37 °C)   Resp 18   Ht 5' 9\" (1.753 m)   Wt 79.2 kg (174 lb 11.2 oz)   SpO2 99%   BMI 25.80 kg/m²       Physical Exam:  General Appearance: NAD, conversant  HENT: normocephalic/atraumatic, moist mucus membranes  Lungs: CTA with normal respiratory effort  CV: RRR, no m/r/g  Abdomen: soft, non-tender, normal bowel sounds  Extremities: no cyanosis, no peripheral edema  Neuro: No focal deficits, motor/sensory intact  Skin: Normal color, intact      Intake and Output:  Current Shift:  01/14 0701 - 01/14 1900  In: 525 [I.V.:525]  Out: 250 [Urine:250]  Last three shifts:  01/12 1901 - 01/14 0700  In: 4220.8 [P.O.:650; I.V.:3570.8]  Out: 200 [Urine:200]    Lab/Data Reviewed:  BMP:   Lab Results   Component Value Date/Time     01/14/2021 03:50 AM    K 3.2 (L) 01/14/2021 03:50 AM     01/14/2021 03:50 AM    CO2 25 01/14/2021 03:50 AM    AGAP 10 01/14/2021 03:50 AM     (H) 01/14/2021 03:50 AM    BUN 47 (H) 01/14/2021 03:50 AM    CREA 3.43 (H) 01/14/2021 03:50 AM    GFRAA 22 (L) 01/14/2021 03:50 AM    GFRNA 18 (L) 01/14/2021 03:50 AM     CBC:   Lab Results   Component Value Date/Time    WBC 2.5 (L) 01/14/2021 03:50 AM    HGB 14.4 01/14/2021 03:50 AM    HCT 41.6 01/14/2021 03:50 AM     (L) 01/14/2021 03:50 AM       Imaging Reviewed:  Ct Abd Pelv Wo Cont    Result Date: 1/13/2021  EXAM: CT ABD PELV WO CONT CLINICAL INDICATION/HISTORY:  diarrhea.   > Additional: None COMPARISON: None   > Correlative imaging: None. TECHNIQUE: Axial CT imaging of the abdomen and pelvis was performed without intravenous contrast. Multiplanar reformats were generated. One or more dose reduction techniques were used on this CT: automated exposure control, adjustment of the mAs and/or kVp according to patient size, and iterative reconstruction techniques. The specific techniques used on this CT exam have been documented in the patient's electronic medical record.  Digital imaging and communications in medicine (DICOM) format image data are available to nonaffiliated external healthcare facilities or entities on a secure, media free, reciprocally searchable basis with patient authorization for at least a 12 month period after this study. _______________ FINDINGS: LOWER CHEST: Essentially clear. LIVER, BILIARY:   > Liver: Unremarkable.   > Biliary: Unremarkable. SPLEEN: Unremarkable. PANCREAS: Unremarkable. ADRENALS: Unremarkable. KIDNEYS/URETERS/BLADDER: Unremarkable. PELVIC ORGANS: Moderate to marked prostate enlargement. GASTROINTESTINAL TRACT: There is possible/probable large bowel wall edema involving the descending, transverse and descending segments. However, the bowel is completely collapsed. There is relative prominence of small arcade vessels around the colon, but there is really little or no pericolonic fat stranding. Whether or not there is mild wall edema in the rectosigmoid is questionable. Small bowel is unremarkable. No evidence of obstruction. VASCULATURE: Unremarkable noncontrast evaluation. LYMPH NODES: No enlarged lymph nodes. OTHER: None. MUSCULOSKELETAL: Unremarkable. _______________     IMPRESSION: 1. Possible/probable colitis. Assuming that this is real, infectious etiology strongly favored over ischemic. 2. Moderate to marked prostate enlargement. Please note: Lack of intravenous contrast enhancement in the abdomen and pelvis may lead to: -- decreased sensitivity for detection of solid organ lesions or injury -- decreased ability to characterize solid organ lesions -- decreased sensitivity for detection and characterization of vascular pathology such as vessel dissection or thrombosis, intestinal ischemia, active hemorrhage or pseudoaneurysm -- decreased sensitivity for detection of acute inflammatory conditions     Xr Chest Port    Result Date: 1/14/2021  A. P. portable chest: Indication: Shortness of breath, concern for pneumonia. The lungs are clear.   The heart and vascularity are normal.     Impression: Negative portable chest.      Medications Reviewed:  Current Facility-Administered Medications   Medication Dose Route Frequency    elvitegravir-cobicistat-emtricitabine-tenofovir alafenamide (GENVOYA) [phone] mg tablet 1 Tab (Patient Supplied)  1 Tab Oral DAILY WITH BREAKFAST    piperacillin-tazobactam (ZOSYN) 3.375 g in 0.9% sodium chloride (MBP/ADV) 100 mL MBP - Extended 4 hour infusion###  3.375 g IntraVENous Q8H    [START ON 1/16/2021] levoFLOXacin (LEVAQUIN) 500 mg in D5W IVPB  500 mg IntraVENous Q48H    [START ON 1/15/2021] enoxaparin (LOVENOX) injection 30 mg  30 mg SubCUTAneous Q24H    sodium bicarbonate (8.4%) 150 mEq in dextrose 5% 1,000 mL infusion   IntraVENous CONTINUOUS    aspirin delayed-release tablet 81 mg  81 mg Oral DAILY    sodium chloride (NS) flush 5-40 mL  5-40 mL IntraVENous Q8H    sodium chloride (NS) flush 5-40 mL  5-40 mL IntraVENous PRN    acetaminophen (TYLENOL) tablet 650 mg  650 mg Oral Q6H PRN    Or    acetaminophen (TYLENOL) suppository 650 mg  650 mg Rectal Q6H PRN    polyethylene glycol (MIRALAX) packet 17 g  17 g Oral DAILY PRN    promethazine (PHENERGAN) tablet 12.5 mg  12.5 mg Oral Q6H PRN    Or    ondansetron (ZOFRAN) injection 4 mg  4 mg IntraVENous Q6H PRN         Sejal Aj PA-C  [facility]  Hospitalist Division  Office:  [phone]  Pager: [phone]

## 2021-01-14 NOTE — ED PROVIDER NOTES
19-year-old male with history of high cholesterol, HIV presenting to the emergency department with complaint of diarrhea for the past 3 days. States he felt like he ate something bad as he has had a queasy stomach. Patient denies vomiting, does admit to some nausea. Also denies fever, chills, chest pain or shortness of breath. No concern for Covid. Patient states he has been laying in bed for the past 3 days due to feeling queasy in his stomach and having the diarrhea. Trying to keep po down but has not had an appetite. Past Medical History:   Diagnosis Date    High cholesterol     HIV (human immunodeficiency virus infection) ([facility] [address]        History reviewed. No pertinent surgical history. History reviewed. No pertinent family history.     Social History     Socioeconomic History    Marital status: SINGLE     Spouse name: Not on file    Number of children: Not on file    Years of education: Not on file    Highest education level: Not on file   Occupational History    Not on file   Social Needs    Financial resource strain: Not on file    Food insecurity     Worry: Not on file     Inability: Not on file    Transportation needs     Medical: Not on file     Non-medical: Not on file   Tobacco Use    Smoking status: Never Smoker   Substance and Sexual Activity    Alcohol use: Yes     Comment: socially    Drug use: Not on file    Sexual activity: Not on file   Lifestyle    Physical activity     Days per week: Not on file     Minutes per session: Not on file    Stress: Not on file   Relationships    Social connections     Talks on phone: Not on file     Gets together: Not on file     Attends Quaker service: Not on file     Active member of club or organization: Not on file     Attends meetings of clubs or organizations: Not on file     Relationship status: Not on file    Intimate partner violence     Fear of current or ex partner: Not on file     Emotionally abused: Not on file Physically abused: Not on file     Forced sexual activity: Not on file   Other Topics Concern    Not on file   Social History Narrative    Not on file         ALLERGIES: Patient has no known allergies. Review of Systems   Constitutional: Negative for chills and fever. HENT: Negative for congestion. Respiratory: Negative for cough and wheezing. Cardiovascular: Negative for chest pain and leg swelling. Gastrointestinal: Negative for abdominal pain, constipation, diarrhea, nausea and vomiting. Genitourinary: Negative. Musculoskeletal: Negative. Skin: Negative. Neurological: Negative for dizziness, seizures, weakness and headaches. Hematological: Negative. Psychiatric/Behavioral: Negative. All other systems reviewed and are negative. Vitals:    01/13/21 1204 01/13/21 1800 01/13/21 1830 01/13/21 2000   BP: (!) 119/90 120/82 120/85 113/75   Pulse: 100 (!) 109 (!) 103 (!) 122   Resp: 18 28 29 25   Temp: 98 °F (36.7 °C)      SpO2: 100%  100%             Physical Exam  Vitals signs and nursing note reviewed. Constitutional:       General: He is not in acute distress. Appearance: He is well-developed. He is not diaphoretic. Comments: NAD, well hydrated, non toxic     HENT:      Head: Normocephalic and atraumatic. Nose: Nose normal.      Mouth/Throat:      Pharynx: No oropharyngeal exudate. Eyes:      Conjunctiva/sclera: Conjunctivae normal.      Pupils: Pupils are equal, round, and reactive to light. Neck:      Musculoskeletal: Normal range of motion and neck supple. Cardiovascular:      Rate and Rhythm: Normal rate and regular rhythm. Pulses: Normal pulses. Heart sounds: Normal heart sounds. No murmur. Pulmonary:      Effort: Pulmonary effort is normal. No respiratory distress. Breath sounds: Normal breath sounds. No wheezing or rales. Abdominal:      General: There is no distension. Palpations: Abdomen is soft. Tenderness:  There is no abdominal tenderness. There is no guarding. Musculoskeletal: Normal range of motion. Lymphadenopathy:      Cervical: No cervical adenopathy. Skin:     General: Skin is warm. Findings: No rash. Neurological:      Mental Status: He is alert and oriented to person, place, and time. Cranial Nerves: No cranial nerve deficit. Coordination: Coordination normal.   Psychiatric:         Behavior: Behavior normal.          MDM       Procedures    Medications ordered:   Medications   sodium bicarbonate (8.4%) 150 mEq in dextrose 5% 1,000 mL infusion ( IntraVENous New Bag 1/13/21 2002)   sodium chloride 0.9 % bolus infusion 1,000 mL (0 mL IntraVENous IV Completed 1/13/21 2005)   sodium chloride 0.9 % bolus infusion 1,000 mL (0 mL IntraVENous IV Completed 1/13/21 2005)   loperamide (IMODIUM) capsule 2 mg (2 mg Oral Given 1/13/21 3168)         Lab findings:         X-Ray, CT or other radiology findings or impressions:  Ct Abd Pelv Wo Cont    Result Date: 1/13/2021  EXAM: CT ABD PELV WO CONT CLINICAL INDICATION/HISTORY:  diarrhea.   > Additional: None COMPARISON: None   > Correlative imaging: None. TECHNIQUE: Axial CT imaging of the abdomen and pelvis was performed without intravenous contrast. Multiplanar reformats were generated. One or more dose reduction techniques were used on this CT: automated exposure control, adjustment of the mAs and/or kVp according to patient size, and iterative reconstruction techniques. The specific techniques used on this CT exam have been documented in the patient's electronic medical record. Digital imaging and communications in medicine (DICOM) format image data are available to nonaffiliated external healthcare facilities or entities on a secure, media free, reciprocally searchable basis with patient authorization for at least a 12 month period after this study. _______________ FINDINGS: LOWER CHEST: Essentially clear.  LIVER, BILIARY:   > Liver: Unremarkable.   > Biliary: Unremarkable. SPLEEN: Unremarkable. PANCREAS: Unremarkable. ADRENALS: Unremarkable. KIDNEYS/URETERS/BLADDER: Unremarkable. PELVIC ORGANS: Moderate to marked prostate enlargement. GASTROINTESTINAL TRACT: There is possible/probable large bowel wall edema involving the descending, transverse and descending segments. However, the bowel is completely collapsed. There is relative prominence of small arcade vessels around the colon, but there is really little or no pericolonic fat stranding. Whether or not there is mild wall edema in the rectosigmoid is questionable. Small bowel is unremarkable. No evidence of obstruction. VASCULATURE: Unremarkable noncontrast evaluation. LYMPH NODES: No enlarged lymph nodes. OTHER: None. MUSCULOSKELETAL: Unremarkable. _______________     IMPRESSION: 1. Possible/probable colitis. Assuming that this is real, infectious etiology strongly favored over ischemic. 2. Moderate to marked prostate enlargement. Please note: Lack of intravenous contrast enhancement in the abdomen and pelvis may lead to: -- decreased sensitivity for detection of solid organ lesions or injury -- decreased ability to characterize solid organ lesions -- decreased sensitivity for detection and characterization of vascular pathology such as vessel dissection or thrombosis, intestinal ischemia, active hemorrhage or pseudoaneurysm -- decreased sensitivity for detection of acute inflammatory conditions        Progress notes, Consult notes or additional Procedure notes:     Labs reviewed, discussed all findings with ed attending, dr. Delma Luo, will admit for rhabdo, deonte, elevated trop. No cp, ekg shows sinus tach at 117 with no stemi, per ed attending. Pt admitted to Dr. Jp Allen, hospitalist     Dispo:  Patient was home in stable condition. Return to the ER if you are unable to obtain referral as directed.        Results have been reviewed with patient and/or caretaker.  They have been counseled regarding the diagnosis, treatment, and plan. They verbally convey understanding and agreement of the signs, symptoms, diagnosis, treatment and prognosis and additionally agrees to follow up as discussed. They also agrees with the care-plan and conveys that all of her questions have been answered. I have also provided discharge instructions for her that include: educational information regarding their diagnosis and treatment, and list of reasons why they would want to return to the ED prior to their follow-up appointment, should the condition change. Stanislav Yarbrough PA-C    Diagnosis:   1. Acute renal failure, unspecified acute renal failure type ([facility] [address]    2. Non-traumatic rhabdomyolysis    3. Elevated troponin        Follow-up Information    None          Patient's Medications   Start Taking    No medications on file   Continue Taking    ACETAMINOPHEN (TYLENOL EXTRA STRENGTH) 500 MG TABLET    Take 2 Tabs by mouth every six (6) hours as needed for Pain or Fever. ASPIRIN DELAYED-RELEASE 81 MG TABLET    Take  by mouth daily. ATAZANAVIR (REYATAZ) 300 MG CAPSULE    Take 600 mg by mouth Daily (before breakfast). CETIRIZINE (ZYRTEC) 10 MG TABLET    Take 1 Tab by mouth daily. EMTRICITABINE (EMTRIVA) 200 MG CAPSULE    Take 200 mg by mouth daily. GUAIFENESIN ER (MUCINEX) 600 MG ER TABLET    Take 1 Tab by mouth two (2) times a day. IBUPROFEN (MOTRIN) 600 MG TABLET    Take 1 Tab by mouth every six (6) hours as needed for Pain. ROSUVASTATIN (CRESTOR) 10 MG TABLET    Take 10 mg by mouth nightly. STAVUDINE PO    Take 50 mg by mouth two (2) times a day.    These Medications have changed    No medications on file   Stop Taking    No medications on file

## 2021-01-14 NOTE — ROUTINE PROCESS
-- TRANSFER - IN REPORT: 
 
Pt being received from  ED for routine progression of care. Report received from [facility]. Report consisted of patients Situation, Background, Assessment and  
Recommendations(SBAR). Information from the following report(s) SBAR, Procedure Summary, MAR and Recent Results was reviewed with the receiving nurse. Opportunity for questions and clarification was provided. --2048--Pt arrived from ED via [facility] with ED nurse. --2055--Dual skin assessment done. Skin is intact. 2307--Pt's Mews is a 5. Will notify provider. 2312--Tylenol given for temp:102.7. 
 
2318--Perfect served Dr Annie Presley about Pt's MEWS. Awaiting response. 2320--Orders received. (See manage orders). 0020--Blood cultures and urine sample sent to the lab. 0036--Due antibiotics started. 0400--Pt informed that pharmacy called to inquire if he takes Stavudine medication and if yes it's not available at pharmacy. Pt states\"yes I take it\" Pt asked if he has med at home and if someone can bring it? Pt states\" Yes I have it at home and I will see if someone will drop off the medication. \" Informed Pt to let nursing staff know when medication id dropped off. Pt verbalize understanding. .Bedside and Verbal shift change report given to [facility] [place] (oncoming nurse) by Abida Gordon (offgoing nurse). Report included the following information SBAR, Kardex, Intake/Output, MAR and Recent Results.

## 2021-01-14 NOTE — PROGRESS NOTES
Physician Progress Note      PATIENT:               Brandon Reaves  CSN #:                  [id number]  :                       1960  ADMIT DATE:       2021 12:05 PM  [address] DATE:  RESPONDING  PROVIDER #:        Darien Walker MD          QUERY TEXT:    Pt admitted with vomiting and diarrhea,  Pt noted to have Temp 102.7, P 118, RR 20-25,  bp  119/69, If possible, please document in the progress notes and discharge summary if you are evaluating and /or treating any of the following: The medical record reflects the following:  Risk Factors: Pt has HIV, YOLIE, Rhabdomyolitis  Clinical Indicators: Wbc  2.5, Neut 42, bands 15,  Temp 102, P 118, RR 20-25,   bp  119-69, Ck  13,354,  Ct Abdomen indicates Colitis  Treatment: IVF, IV Abx, cont hiv meds,    Thank you,  Christy Flannery RN/CDI  [phone]  Options provided:  -- Sepsis, present on admission  -- Sepsis, present on admission now resolved  -- Sepsis, not present on admission  -- No Sepsis, *** (specify localized infection such as pneumonia or cellulitis) only  -- Sepsis was ruled out  -- Other - I will add my own diagnosis  -- Disagree - Not applicable / Not valid  -- Disagree - Clinically unable to determine / Unknown  -- Refer to Clinical Documentation Reviewer    PROVIDER RESPONSE TEXT:    This patient has sepsis that was not present on admission.     Query created by: Sunil Wray on 2021 2:25 PM      Electronically signed by:  Darien Walker MD 2021 5:27 PM

## 2021-01-14 NOTE — PROGRESS NOTES
Patient has developed new fever and tachycardia. This is in the setting of acute renal failure and diarrhea. CT of the abdomen shows possible colitis. He is known to have HIV  Will send CXR and blood cultures. Urinalysis does not look overly worrisome  Will send urine culture  Will start antibiotics and give fluid. Continue to follow.

## 2021-01-15 PROBLEM — E87.6 HYPOKALEMIA: Status: ACTIVE | Noted: 2021-01-15

## 2021-01-15 LAB
ANION GAP SERPL CALC-SCNC: 6 MMOL/L (ref 3–18)
APPEARANCE UR: CLEAR
ATRIAL RATE: 117 BPM
BACTERIA SPEC CULT: NORMAL
BACTERIA URNS QL MICRO: ABNORMAL /HPF
BILIRUB UR QL: NEGATIVE
BUN SERPL-MCNC: 53 MG/DL (ref 7–18)
BUN/CREAT SERPL: 12 (ref 12–20)
CALCIUM SERPL-MCNC: 8.8 MG/DL (ref 8.5–10.1)
CALCULATED P AXIS, ECG09: 64 DEGREES
CALCULATED R AXIS, ECG10: 28 DEGREES
CALCULATED T AXIS, ECG11: 121 DEGREES
CHLORIDE SERPL-SCNC: 96 MMOL/L (ref 100–111)
CK SERPL-CCNC: 1699 U/L (ref 39–308)
CO2 SERPL-SCNC: 38 MMOL/L (ref 21–32)
COLOR UR: YELLOW
CREAT SERPL-MCNC: 4.38 MG/DL (ref 0.6–1.3)
DIAGNOSIS, 93000: NORMAL
EPITH CASTS URNS QL MICRO: ABNORMAL /LPF (ref 0–5)
GLUCOSE SERPL-MCNC: 97 MG/DL (ref 74–99)
GLUCOSE UR STRIP.AUTO-MCNC: NEGATIVE MG/DL
HGB UR QL STRIP: ABNORMAL
KETONES UR QL STRIP.AUTO: NEGATIVE MG/DL
LEUKOCYTE ESTERASE UR QL STRIP.AUTO: NEGATIVE
MAGNESIUM SERPL-MCNC: 2.1 MG/DL (ref 1.6–2.6)
NITRITE UR QL STRIP.AUTO: NEGATIVE
P-R INTERVAL, ECG05: 122 MS
PH UR STRIP: 8.5 [PH] (ref 5–8)
POTASSIUM SERPL-SCNC: 2.8 MMOL/L (ref 3.5–5.5)
PROT UR STRIP-MCNC: 30 MG/DL
Q-T INTERVAL, ECG07: 324 MS
QRS DURATION, ECG06: 100 MS
QTC CALCULATION (BEZET), ECG08: 451 MS
RBC #/AREA URNS HPF: ABNORMAL /HPF (ref 0–5)
SERVICE CMNT-IMP: NORMAL
SODIUM SERPL-SCNC: 140 MMOL/L (ref 136–145)
SP GR UR REFRACTOMETRY: 1.01 (ref 1–1.03)
UROBILINOGEN UR QL STRIP.AUTO: 0.2 EU/DL (ref 0.2–1)
VENTRICULAR RATE, ECG03: 117 BPM
WBC URNS QL MICRO: ABNORMAL /HPF (ref 0–4)

## 2021-01-15 PROCEDURE — 36415 COLL VENOUS BLD VENIPUNCTURE: CPT

## 2021-01-15 PROCEDURE — 74011250636 HC RX REV CODE- 250/636: Performed by: INTERNAL MEDICINE

## 2021-01-15 PROCEDURE — 74011250636 HC RX REV CODE- 250/636: Performed by: PHYSICIAN ASSISTANT

## 2021-01-15 PROCEDURE — 81001 URINALYSIS AUTO W/SCOPE: CPT

## 2021-01-15 PROCEDURE — 74011000258 HC RX REV CODE- 258: Performed by: HOSPITALIST

## 2021-01-15 PROCEDURE — 83735 ASSAY OF MAGNESIUM: CPT

## 2021-01-15 PROCEDURE — 2709999900 HC NON-CHARGEABLE SUPPLY

## 2021-01-15 PROCEDURE — 74011000250 HC RX REV CODE- 250: Performed by: INTERNAL MEDICINE

## 2021-01-15 PROCEDURE — 74011250636 HC RX REV CODE- 250/636: Performed by: HOSPITALIST

## 2021-01-15 PROCEDURE — 65270000029 HC RM PRIVATE

## 2021-01-15 PROCEDURE — 80048 BASIC METABOLIC PNL TOTAL CA: CPT

## 2021-01-15 PROCEDURE — 74011250637 HC RX REV CODE- 250/637: Performed by: HOSPITALIST

## 2021-01-15 PROCEDURE — 74011250637 HC RX REV CODE- 250/637: Performed by: INTERNAL MEDICINE

## 2021-01-15 PROCEDURE — 82550 ASSAY OF CK (CPK): CPT

## 2021-01-15 RX ORDER — POTASSIUM CHLORIDE 20 MEQ/1
40 TABLET, EXTENDED RELEASE ORAL ONCE
Status: COMPLETED | OUTPATIENT
Start: 2021-01-15 | End: 2021-01-15

## 2021-01-15 RX ORDER — SODIUM CHLORIDE AND POTASSIUM CHLORIDE .9; .15 G/100ML; G/100ML
SOLUTION INTRAVENOUS CONTINUOUS
Status: DISCONTINUED | OUTPATIENT
Start: 2021-01-15 | End: 2021-01-17

## 2021-01-15 RX ORDER — POTASSIUM CHLORIDE 7.45 MG/ML
10 INJECTION INTRAVENOUS ONCE
Status: COMPLETED | OUTPATIENT
Start: 2021-01-15 | End: 2021-01-15

## 2021-01-15 RX ADMIN — POTASSIUM CHLORIDE 10 MEQ: 10 INJECTION, SOLUTION INTRAVENOUS at 07:51

## 2021-01-15 RX ADMIN — ASPIRIN 81 MG: 81 TABLET, COATED ORAL at 08:52

## 2021-01-15 RX ADMIN — POTASSIUM CHLORIDE AND SODIUM CHLORIDE: 900; 150 INJECTION, SOLUTION INTRAVENOUS at 10:00

## 2021-01-15 RX ADMIN — POTASSIUM CHLORIDE AND SODIUM CHLORIDE: 900; 150 INJECTION, SOLUTION INTRAVENOUS at 18:20

## 2021-01-15 RX ADMIN — POTASSIUM CHLORIDE 10 MEQ: 7.46 INJECTION, SOLUTION INTRAVENOUS at 05:48

## 2021-01-15 RX ADMIN — Medication 10 ML: at 05:00

## 2021-01-15 RX ADMIN — POTASSIUM CHLORIDE 10 MEQ: 10 INJECTION, SOLUTION INTRAVENOUS at 08:53

## 2021-01-15 RX ADMIN — POTASSIUM CHLORIDE 40 MEQ: 1500 TABLET, EXTENDED RELEASE ORAL at 08:52

## 2021-01-15 RX ADMIN — PIPERACILLIN AND TAZOBACTAM 3.38 G: 3; .375 INJECTION, POWDER, LYOPHILIZED, FOR SOLUTION INTRAVENOUS at 19:46

## 2021-01-15 RX ADMIN — PIPERACILLIN AND TAZOBACTAM 3.38 G: 3; .375 INJECTION, POWDER, LYOPHILIZED, FOR SOLUTION INTRAVENOUS at 05:00

## 2021-01-15 RX ADMIN — ENOXAPARIN SODIUM 30 MG: 30 INJECTION SUBCUTANEOUS at 08:53

## 2021-01-15 RX ADMIN — POTASSIUM CHLORIDE 10 MEQ: 10 INJECTION, SOLUTION INTRAVENOUS at 06:47

## 2021-01-15 RX ADMIN — Medication 10 ML: at 15:05

## 2021-01-15 RX ADMIN — PIPERACILLIN AND TAZOBACTAM 3.38 G: 3; .375 INJECTION, POWDER, LYOPHILIZED, FOR SOLUTION INTRAVENOUS at 12:46

## 2021-01-15 RX ADMIN — SODIUM BICARBONATE: 84 INJECTION, SOLUTION INTRAVENOUS at 04:19

## 2021-01-15 RX ADMIN — Medication 10 ML: at 22:00

## 2021-01-15 NOTE — PROGRESS NOTES
Internal Medicine Progress Note    Patient's Name: Kylee Jaimes  Admit Date: 1/13/2021  Length of Stay: 2      Assessment/Plan     Principal Problem:    Rhabdomyolysis (1/13/2021)    Active Problems:    DEONTE (acute kidney injury) ([facility] [address] (1/14/2021)      HIV (human immunodeficiency virus infection) ([facility] [address] (1/14/2021)      Diarrhea (1/14/2021)      Hypokalemia (1/15/2021)      Rhabdo/DEONTE  - switch to NS +K  - monitor BMP  - CK trending down  - appreciate nephrology    Diarrhea  - C diff neg  - check O&P, enteric bacteria panel  - patient requesting fleets enema which is not recommended per nephro. Will order tap water if patient still wants one. HIV  - d/c genvoya due to DEONTE    Hypokalemia  - 2/2 diarrhea  - replace and recheck    - Cont acceptable home medications for chronic conditions   - DVT protocol    I have personally reviewed all pertinent labs and films that have officially resulted over the last 24 hours. I have personally checked for all pending labs that are awaiting final results. Anticipated discharge: >2 days    HPI     Kylee Jaimes is a 63 y.o.  male who presents with diarrhea and weakness. He has a hx hiv with compliance with haart. Was in usual state of health until Monday evening, started diarrhea, watery, severe. Pt has continued with this for last 2 days, also occasional vomiting, thought this was food poisoning. He is found to have rhabdo in ed, with deonte. Pt denies being down on floor, denies alcoholism, no new meds, no injury/trauma, no harsh workouts or marathons, no cocaine. He continues to have diarrhea. Hospital Course     He was started on aggressive IVF. C diff negative. Nephrology consulted as renal function continued to worsen. Subjective     Pt s/e @ bedside. No major events overnight. Pt says diarrhea continues, feels like there's stool stuck in his rectum and requesting enema. Denies abd pain. Denies myalgias.     Objective     Visit Vitals  /63   Pulse 74   Temp 97.8 °F (36.6 °C)   Resp 20   Ht 5' 9\" (1.753 m)   Wt 80.3 kg (177 lb)   SpO2 92%   BMI 26.14 kg/m²       Physical Exam:  General Appearance: NAD, conversant  HENT: normocephalic/atraumatic, moist mucus membranes  Lungs: CTA with normal respiratory effort  CV: RRR, no m/r/g  Abdomen: soft, non-tender, normal bowel sounds  Extremities: no cyanosis, no peripheral edema  Neuro: No focal deficits, motor/sensory intact  Skin: Normal color, intact      Intake and Output:  Current Shift:  01/15 0701 - 01/15 1900  In: 1097.1 [P.O.:720; I.V.:377.1]  Out: 300 [Urine:300]  Last three shifts:  01/13 1901 - 01/15 0700  In: 8238.8 [P.O.:1570; I.V.:6668.8]  Out: 1300 [Urine:1150]    Lab/Data Reviewed:  BMP:   Lab Results   Component Value Date/Time     01/15/2021 03:40 AM    K 2.8 (LL) 01/15/2021 03:40 AM    CL 96 (L) 01/15/2021 03:40 AM    CO2 38 (H) 01/15/2021 03:40 AM    AGAP 6 01/15/2021 03:40 AM    GLU 97 01/15/2021 03:40 AM    BUN 53 (H) 01/15/2021 03:40 AM    CREA 4.38 (H) 01/15/2021 03:40 AM    GFRAA 17 (L) 01/15/2021 03:40 AM    GFRNA 14 (L) 01/15/2021 03:40 AM     CBC:   No results found for: WBC, HGB, HGBEXT, HCT, HCTEXT, PLT, PLTEXT, HGBEXT, HCTEXT, PLTEXT    Imaging Reviewed:  No results found.     Medications Reviewed:  Current Facility-Administered Medications   Medication Dose Route Frequency    0.9% sodium chloride with KCl 20 mEq/L infusion   IntraVENous CONTINUOUS    piperacillin-tazobactam (ZOSYN) 3.375 g in 0.9% sodium chloride (MBP/ADV) 100 mL MBP - Extended 4 hour infusion###  3.375 g IntraVENous Q8H    [START ON 1/16/2021] levoFLOXacin (LEVAQUIN) 500 mg in D5W IVPB  500 mg IntraVENous Q48H    enoxaparin (LOVENOX) injection 30 mg  30 mg SubCUTAneous Q24H    aspirin delayed-release tablet 81 mg  81 mg Oral DAILY    sodium chloride (NS) flush 5-40 mL  5-40 mL IntraVENous Q8H    sodium chloride (NS) flush 5-40 mL  5-40 mL IntraVENous PRN    acetaminophen (TYLENOL) tablet 650 mg  650 mg Oral Q6H PRN    Or    acetaminophen (TYLENOL) suppository 650 mg  650 mg Rectal Q6H PRN    polyethylene glycol (MIRALAX) packet 17 g  17 g Oral DAILY PRN    promethazine (PHENERGAN) tablet 12.5 mg  12.5 mg Oral Q6H PRN    Or    ondansetron (ZOFRAN) injection 4 mg  4 mg IntraVENous Q6H PRN         Adrien Mac PA-C  [facility]  Hospitalist Division  Office:  [phone]  Pager: [phone]

## 2021-01-15 NOTE — PROGRESS NOTES
Received pt from ABHIJIT RIVAS. Pt alert, awake, no complaints at this time. 1910: Bedside shift change report given to Bianka (oncoming nurse) by Meghan Lara (offgoing nurse). Report included the following information SBAR, Kardex, Intake/Output and Quality Measures.

## 2021-01-15 NOTE — PROGRESS NOTES
Problem: Patient Education:  Go to Education Activity  Goal: Patient/Family Education  Outcome: Progressing Towards Goal     Problem: Discharge Planning  Goal: *Discharge to safe environment  Outcome: Progressing Towards Goal     Problem: Pain  Goal: *Control of Pain  Outcome: Progressing Towards Goal     Problem: Patient Education: Go to Patient Education Activity  Goal: Patient/Family Education  Outcome: Progressing Towards Goal     Problem: Falls - Risk of  Goal: *Absence of Falls  Description: Document Danne Lobe Fall Risk and appropriate interventions in the flowsheet.   Outcome: Progressing Towards Goal  Note: Fall Risk Interventions:                                Problem: Patient Education: Go to Patient Education Activity  Goal: Patient/Family Education  Outcome: Progressing Towards Goal

## 2021-01-15 NOTE — PROGRESS NOTES
3634 Report received from [facility] using SBAR and MAR.     1000 Upon arriving to pt room, pt sitting in bed watching television. Pt is alert and oriented and denies pain. Changed out IVF per MD orders. Pt stated Dr. Shelby Ricci just left at that all questions were asked. Dr. Nikki Weaver entered the room to talk to the pt.     1010 Spoke with LUIS Farley about pt request for a fleets enema and was told she will order. Also this writer discussed if there was a need for telemetry monitoring and was told not at this time. Pt has had no significant events at this time, but is continuing electrolyte replacement. 1246 IV antibiotics initiated per orders. Informed by LUIS Farley that fleets enema is contraindicated and tap water enema is more appropriate for pt if he still wants one. Informed pt and he wants to self administer. [address] enema administered. Pt tolerated well. Pt left to have privacy to use bedside commode. Clean catch urine sample provided by pt and taken to the lab. 1430 Post tap water enema, pt eliminated the entire 1000 ml instilled. Color is dark green and watery. 1645 Bedside and Verbal shift change report given to Yuly Guzman (oncoming nurse) by Garry Marin RN   (offgoing nurse). Report included the following information SBAR and MAR.

## 2021-01-15 NOTE — PROGRESS NOTES
completed follow up visit with and Spiritual assessment of patient in room 2212 and offered Pastoral care support to the patient. Patient talked of possible food poisoning and how it ran through his body for three day until he had no energy left to even get out of the bathroom. He was glad that he was able to be brought here so that he could get some help and relief. Patient admitted to dehydration and numbers being all out of wack.  Chaplains will continue to follow and will provide pastoral care on an as needed/requested basis    Chaplain Ishmael Dominguez   Board Certified [address] [place] [address]   [phone]

## 2021-01-15 NOTE — PROGRESS NOTES
Problem: Discharge Planning  Goal: *Discharge to safe environment  Outcome: Progressing Towards Goal   Plan home     Plan home, cm to follow for needs. Care Management Interventions  PCP Verified by CM: Yes  Palliative Care Criteria Met (RRAT>21 & CHF Dx)?: No  Mode of Transport at Discharge:  Other (see comment)  Transition of Care Consult (CM Consult): Discharge Planning  Discharge Durable Medical Equipment: No  Physical Therapy Consult: No  Occupational Therapy Consult: No  Speech Therapy Consult: No  Confirm Follow Up Transport: Self  The Patient and/or Patient Representative was Provided with a Choice of Provider and Agrees with the Discharge Plan?: No  Freedom of Choice List was Provided with Basic Dialogue that Supports the Patient's Individualized Plan of Care/Goals, Treatment Preferences and Shares the Quality Data Associated with the Providers?: No  Discharge Location  Discharge Placement: Home

## 2021-01-15 NOTE — ROUTINE PROCESS
1948: Assumed care. Awake. Quietly resting in bed. On IVF of Sodium Bicard at 125 ml/hr. Infusing well to Rt arm. Denies any pain or discomfort at this time. Call light within reach. 2147: Due med given. 2341: No change from previous assessment. 0200: Sleeping. 9040: No change from previous assessment. 0500: Due med given. Notified MD of critical K+ level & received order. 7138: K+ level is 2.8. 1st bag of Potassium Chloride 10 mEq in 100 ml hang. 1199: 2nd bag of Kcl 10 mEq hang. 7808: 3rd bag of Kcl 10 mEq hang. Due meds given. 1908: 4th bag of KCl 10 mEq hang. Due meds given. 6922: Slept on & off thru night. Needs attended. Bedside and Verbal shift change report given to Hugo LACEY (oncoming nurse) by me (offgoing nurse). Report included the following information SBAR, Kardex, Intake/Output, MAR and Recent Results.

## 2021-01-15 NOTE — CONSULTS
Consult Note    Assessment:   · YOLIE due to volume depletion with possible progression to ischemic atn/rhabdomyolysis. Use of [facility] I prior to admission [facility] Tenofovir also has been associated with yolie. · Hypokalemia. · Rhabdomyolysis, improving. As far as etio- suspect volume depletion/statin/and possibly art. · HTN, h/o off. BP is currently stable off antihtn. · HIV. · Acute gastroenteritis, mgmt is deferred to primary team.  · Episode of fever, on abx. Recommendations:   · Agree with changing ivf to NS with KCL. Alkalosis due to use of [place] noted. · Repeat ua to look for blood by dipstick, ph.   · Continue to monitor CPK, K, phos, Mg.   · Continue to hold lisinopril. · Continue to hold crestor. · D/c genoya, it is contraindicated in patients with yolie, low gfr. · Avoid NSAID's, IV dye. · Avoid Gadolinium due to its association with nephrogenic systemic fibrosis in a patients with severe ARF and ESRD. · Avoid fleets enemas due to concern for acute phosphate nephropathy. · Please dose all medications for approximate creatinine clearance  30-15. · Will monitor labs over the weekend and f/u on Monday, please call if any questions. Thank you. Consult requested by: Carina Calvert MD    ADMIT DATE: 1/13/2021  CONSULT DATE: January 15, 2021                 Admission diagnosis: Rhabdomyolysis   Reason for Nephrology Consultation: YOLIE. HPI: Jesse Pantoja is a 63 y.o. male [address] with h/o of well controlled hiv, hypercholesterolemia and htn who presented with 2-3 days h/o of severe watery diarrhea. He also reports nausea but no vomiting. Appetite also became poor. In ED patient was stable, was found to be stable but in yolie and in rhabdomyolysis. He was admitted, started on bicarb drip. He was febrile on 1/13 and was started on empiric abx. Patient feels better, diarrhea is slowing down, tolerates diet.  CK is down from 31888 on admission to 1699 today. No prior h/o of kidney disease. Scr was 2.41 on admission, is up to 4.38 today. Past Medical History:   Diagnosis Date    High cholesterol     [facility] virus [facility]       History reviewed. No pertinent surgical history. Social History     Socioeconomic History    Marital status: SINGLE     Spouse name: Not on file    Number of children: Not on file    Years of education: Not on file    Highest education level: Not on file   Occupational History    Not on file   Social Needs    Financial resource strain: Not on file    Food insecurity     Worry: Not on file     Inability: Not on file    Transportation needs     Medical: Not on file     Non-medical: Not on file   Tobacco Use    Smoking status: Never Smoker   Substance and Sexual Activity    Alcohol use: Yes     Comment: socially    Drug use: Not on file    Sexual activity: Not on file   Lifestyle    Physical activity     Days per week: Not on file     Minutes per session: Not on file    Stress: Not on file   Relationships    Social connections     Talks on phone: Not on file     Gets together: Not on file     Attends [employer] service: Not on file     Active member of club or organization: Not on file     Attends meetings of clubs or organizations: Not on file     Relationship status: Not on file    Intimate partner violence     Fear of current or ex partner: Not on file     Emotionally abused: Not on file     Physically abused: Not on file     Forced sexual activity: Not on file   Other Topics Concern    Not on file   Social History Narrative    Not on file       History reviewed. No pertinent family history. No Known Allergies     Home Medications:     Medications Prior to Admission   Medication Sig    lisinopriL (PRINIVIL, ZESTRIL) 20 mg tablet Take 20 mg by mouth daily.     elvitegravir-cobicistat-emtricitabine-tenofovir alafenamide (Genvoya) tab tablet Take 1 Tab by mouth daily (with breakfast).  aspirin delayed-release 81 mg tablet Take  by mouth daily.  cetirizine (ZYRTEC) 10 mg tablet Take 1 Tab by mouth daily.  acetaminophen (TYLENOL EXTRA STRENGTH) 500 mg tablet Take 2 Tabs by mouth every six (6) hours as needed for Pain or Fever.  rosuvastatin (Crestor) 5 mg tablet Take 5 mg by mouth nightly.  guaiFENesin ER (MUCINEX) 600 mg ER tablet Take 1 Tab by mouth two (2) times a day. Current Inpatient Medications:     Current Facility-Administered Medications   Medication Dose Route Frequency    0.9% sodium chloride with KCl 20 mEq/L infusion   IntraVENous CONTINUOUS    elvitegravir-cobicistat-emtricitabine-tenofovir alafenamide (GENVOYA) [phone] mg tablet 1 Tab (Patient Supplied)  1 Tab Oral DAILY WITH BREAKFAST    piperacillin-tazobactam (ZOSYN) 3.375 g in 0.9% sodium chloride (MBP/ADV) 100 mL MBP - Extended 4 hour infusion###  3.375 g IntraVENous Q8H    [START ON 1/16/2021] levoFLOXacin (LEVAQUIN) 500 mg in D5W IVPB  500 mg IntraVENous Q48H    enoxaparin (LOVENOX) injection 30 mg  30 mg SubCUTAneous Q24H    aspirin delayed-release tablet 81 mg  81 mg Oral DAILY    sodium chloride (NS) flush 5-40 mL  5-40 mL IntraVENous Q8H    sodium chloride (NS) flush 5-40 mL  5-40 mL IntraVENous PRN    acetaminophen (TYLENOL) tablet 650 mg  650 mg Oral Q6H PRN    Or    acetaminophen (TYLENOL) suppository 650 mg  650 mg Rectal Q6H PRN    polyethylene glycol (MIRALAX) packet 17 g  17 g Oral DAILY PRN    promethazine (PHENERGAN) tablet 12.5 mg  12.5 mg Oral Q6H PRN    Or    ondansetron (ZOFRAN) injection 4 mg  4 mg IntraVENous Q6H PRN       Review of Systems:   No fever or chills. No sore throat. No cough or hemoptysis. No shortness of breath or chest pain. No orthopnea or paroxysmal nocturnal dyspnea. No vomiting, abdominal pain, melena or hematochezia. No constipation. No dysuria, no gross hematuria of voiding difficulties. No ankle swelling, no joint paints.  No muscle aches. No skin changes. C/o dizziness and  lightheadedness. No headaches. Physical Assessment:     Vitals:    01/14/21 2038 01/14/21 2341 01/15/21 0416 01/15/21 0802   BP: 109/76 101/67 104/71 98/64   Pulse: 75 77 71 70   Resp: 18 18 18 18   Temp: 98.5 °F (36.9 °C) 98.4 °F (36.9 °C) 97.8 °F (36.6 °C) 98.4 °F (36.9 °C)   SpO2: 99% 97% 98% 99%   Weight:   80.3 kg (177 lb)    Height:         Last 3 Recorded Weights in this Encounter    01/14/21 0940 01/15/21 0416   Weight: 79.2 kg (174 lb 11.2 oz) 80.3 kg (177 lb)     Admission weight: Weight: 79.2 kg (174 lb 11.2 oz) (01/14/21 0940)      Intake/Output Summary (Last 24 hours) at 1/15/2021 1020  Last data filed at 1/15/2021 1000  Gross per 24 hour   Intake 4875 ml   Output 1250 ml   Net 3625 ml       Patient is in no apparent distress. HEENT: Head is normocephalic and atraumatic. Pupils are round, equal, reactive to light. Sclerae are anicteric. Dry oral mucosa. Neck: no cervical lymphadenopathy or thyromegaly. Lungs: good air entry, clear to auscultation bilaterally. Trachea at the midline. Cardiovascular system: S1, S2, regular rate and rhythm. No murmurs, gallops or rubs. No jvd. Carotid upstroke 2 + bilaterally. Abdomen: soft, non tender, slightly  distended. Positive bowel sounds. No hepatosplenomegaly. No abdominal bruits. Extremities: no clubbing, cyanosis or edema. Strong dorsalis pedis pulses. Brisk capillary refill on the toes bilaterally. Integumentary: skin is grossly intact. Neurologic: Alert, oriented time three. Cooperative and appropriate. No gross motor or sensory deficits.        Data Review:    Labs: Results:       Chemistry Recent Labs     01/15/21  0340 01/14/21  0350 01/13/21  1230   GLU 97 115* 134*    137 134*   K 2.8* 3.2* 3.4*   CL 96* 102 99*   CO2 38* 25 27   BUN 53* 47* 35*   CREA 4.38* 3.43* 2.41*   CA 8.8 8.7 10.6*   AGAP 6 10 8   BUCR 12 14 15   PHOS  --  2.1* 3.2         CBC w/Diff Recent Labs     01/14/21  0350 01/13/21  1230   WBC 2.5* 5.0   RBC 4.57* 5.61*   HGB 14.4 18.0*   HCT 41.6 52.5*   * 130*   GRANS 42 41   LYMPH 38 31   EOS 0 0         Iron/Ferritin No results for input(s): IRON in the last 72 hours. No lab exists for component: TIBCCALC   PTH/VIT D No results for input(s): PTH in the last 72 hours.     No lab exists for component: VITD           Amalia Platt M.D  [facility]  Office [phone]  Pager [phone]    January 15, 2021

## 2021-01-16 PROBLEM — A03.9 SHIGELLA GASTROENTERITIS: Status: ACTIVE | Noted: 2021-01-16

## 2021-01-16 LAB
ALBUMIN SERPL-MCNC: 1.8 G/DL (ref 3.4–5)
ANION GAP SERPL CALC-SCNC: 5 MMOL/L (ref 3–18)
BUN SERPL-MCNC: 54 MG/DL (ref 7–18)
BUN/CREAT SERPL: 12 (ref 12–20)
CALCIUM SERPL-MCNC: 9.1 MG/DL (ref 8.5–10.1)
CAMPYLOBACTER SPECIES, DNA: NEGATIVE
CHLORIDE SERPL-SCNC: 105 MMOL/L (ref 100–111)
CK SERPL-CCNC: 339 U/L (ref 39–308)
CO2 SERPL-SCNC: 33 MMOL/L (ref 21–32)
CREAT SERPL-MCNC: 4.48 MG/DL (ref 0.6–1.3)
ENTEROTOXIGEN E COLI, DNA: NEGATIVE
GLUCOSE BLD STRIP.AUTO-MCNC: 135 MG/DL (ref 70–110)
GLUCOSE SERPL-MCNC: 75 MG/DL (ref 74–99)
MAGNESIUM SERPL-MCNC: 2.3 MG/DL (ref 1.6–2.6)
P SHIGELLOIDES DNA STL QL NAA+PROBE: NEGATIVE
PHOSPHATE SERPL-MCNC: 1.9 MG/DL (ref 2.5–4.9)
POTASSIUM SERPL-SCNC: 3.5 MMOL/L (ref 3.5–5.5)
SALMONELLA SPECIES, DNA: NEGATIVE
SHIGA TOXIN PRODUCING, DNA: NEGATIVE
SHIGELLA SP+EIEC IPAH STL QL NAA+PROBE: POSITIVE
SODIUM SERPL-SCNC: 143 MMOL/L (ref 136–145)
VIBRIO SPECIES, DNA: NEGATIVE
Y. ENTEROCOLITICA, DNA: NEGATIVE

## 2021-01-16 PROCEDURE — 74011250636 HC RX REV CODE- 250/636: Performed by: HOSPITALIST

## 2021-01-16 PROCEDURE — 74011000258 HC RX REV CODE- 258: Performed by: HOSPITALIST

## 2021-01-16 PROCEDURE — 65270000029 HC RM PRIVATE

## 2021-01-16 PROCEDURE — 74011250637 HC RX REV CODE- 250/637: Performed by: INTERNAL MEDICINE

## 2021-01-16 PROCEDURE — 80069 RENAL FUNCTION PANEL: CPT

## 2021-01-16 PROCEDURE — 74011250636 HC RX REV CODE- 250/636: Performed by: PHYSICIAN ASSISTANT

## 2021-01-16 PROCEDURE — 83735 ASSAY OF MAGNESIUM: CPT

## 2021-01-16 PROCEDURE — 36415 COLL VENOUS BLD VENIPUNCTURE: CPT

## 2021-01-16 PROCEDURE — 82962 GLUCOSE BLOOD TEST: CPT

## 2021-01-16 PROCEDURE — 82550 ASSAY OF CK (CPK): CPT

## 2021-01-16 RX ORDER — LEVOFLOXACIN 250 MG/1
500 TABLET ORAL
Status: DISCONTINUED | OUTPATIENT
Start: 2021-01-18 | End: 2021-01-17

## 2021-01-16 RX ADMIN — PIPERACILLIN AND TAZOBACTAM 3.38 G: 3; .375 INJECTION, POWDER, LYOPHILIZED, FOR SOLUTION INTRAVENOUS at 04:22

## 2021-01-16 RX ADMIN — POTASSIUM CHLORIDE AND SODIUM CHLORIDE: 900; 150 INJECTION, SOLUTION INTRAVENOUS at 02:35

## 2021-01-16 RX ADMIN — Medication 10 ML: at 16:56

## 2021-01-16 RX ADMIN — ENOXAPARIN SODIUM 30 MG: 30 INJECTION SUBCUTANEOUS at 08:42

## 2021-01-16 RX ADMIN — POTASSIUM CHLORIDE AND SODIUM CHLORIDE: 900; 150 INJECTION, SOLUTION INTRAVENOUS at 10:47

## 2021-01-16 RX ADMIN — PIPERACILLIN AND TAZOBACTAM 3.38 G: 3; .375 INJECTION, POWDER, LYOPHILIZED, FOR SOLUTION INTRAVENOUS at 11:55

## 2021-01-16 RX ADMIN — Medication 10 ML: at 06:38

## 2021-01-16 RX ADMIN — LEVOFLOXACIN 500 MG: 5 INJECTION, SOLUTION INTRAVENOUS at 00:40

## 2021-01-16 RX ADMIN — DIBASIC SODIUM PHOSPHATE, MONOBASIC POTASSIUM PHOSPHATE AND MONOBASIC SODIUM PHOSPHATE 1 TABLET: 852; 155; 130 TABLET ORAL at 16:55

## 2021-01-16 RX ADMIN — Medication 10 ML: at 21:42

## 2021-01-16 RX ADMIN — POTASSIUM CHLORIDE AND SODIUM CHLORIDE: 900; 150 INJECTION, SOLUTION INTRAVENOUS at 19:52

## 2021-01-16 RX ADMIN — ASPIRIN 81 MG: 81 TABLET, COATED ORAL at 08:42

## 2021-01-16 RX ADMIN — DIBASIC SODIUM PHOSPHATE, MONOBASIC POTASSIUM PHOSPHATE AND MONOBASIC SODIUM PHOSPHATE 1 TABLET: 852; 155; 130 TABLET ORAL at 21:42

## 2021-01-16 RX ADMIN — PIPERACILLIN AND TAZOBACTAM 3.38 G: 3; .375 INJECTION, POWDER, LYOPHILIZED, FOR SOLUTION INTRAVENOUS at 19:52

## 2021-01-16 NOTE — PROGRESS NOTES
1510- Received call from Joseph Bond from [facility]. Pt's stool sample from 1/14 is + for Shigella. Will perfect serve the Doctor.

## 2021-01-16 NOTE — PROGRESS NOTES
Internal Medicine Progress Note    Patient's Name: Billey Goodpasture  Admit Date: 1/13/2021  Length of Stay: 3      Assessment/Plan     Principal Problem:    Rhabdomyolysis (1/13/2021)    Active Problems:    YOLIE (acute kidney injury) ([facility] (1/14/2021)      HIV (human immunodeficiency virus infection) ([facility] (1/14/2021)      Diarrhea (1/14/2021)      Hypokalemia (1/15/2021)      Shigella gastroenteritis (1/16/2021)      Rhabdo/YOLIE  - switch to NS +K  - monitor BMP - Cr seems to be near plateau  - CK trending down  - appreciate nephrology    Shigella gastroenteritis  - d/c zosyn, continue PO levaquin to complete 7 days total treatment    HIV  - d/c genvoya due to YOLIE    Hypokalemia  - 2/2 diarrhea  - replace and recheck    - Cont acceptable home medications for chronic conditions   - DVT protocol    I have personally reviewed all pertinent labs and films that have officially resulted over the last 24 hours. I have personally checked for all pending labs that are awaiting final results. Anticipated discharge: >2 days    HPI     Billey Goodpasture is a 63 y.o.  male who presents with diarrhea and weakness. He has a hx hiv with compliance with haart. Was in usual state of health until Monday evening, started diarrhea, watery, severe. Pt has continued with this for last 2 days, also occasional vomiting, thought this was food poisoning. He is found to have rhabdo in ed, with yolie. Pt denies being down on floor, denies alcoholism, no new meds, no injury/trauma, no harsh workouts or marathons, no cocaine. He continues to have diarrhea. Hospital Course     He was started on aggressive IVF. C diff negative. Nephrology consulted as renal function continued to worsen. Further stool studies confirmed shigella species. Abx adjusted. Subjective     Pt s/e @ bedside. No major events overnight. Pt feels good today. No complaints. Denies abd pain. Denies myalgias.     Objective     Visit Vitals  /64   Pulse 67 Temp 98.8 °F (37.1 °C)   Resp 20   Ht 5' 9\" (1.753 m)   Wt 83.5 kg (184 lb)   SpO2 98%   BMI 27.17 kg/m²       Physical Exam:  General Appearance: NAD, conversant  HENT: normocephalic/atraumatic, moist mucus membranes  Lungs: CTA with normal respiratory effort  CV: RRR, no m/r/g  Abdomen: soft, non-tender, normal bowel sounds  Extremities: no cyanosis, no peripheral edema  Neuro: No focal deficits, motor/sensory intact  Skin: Normal color, intact      Intake and Output:  Current Shift:  01/16 0701 - 01/16 1900  In: 420 [P.O.:420]  Out: 550 [Urine:550]  Last three shifts:  01/14 1901 - 01/16 0700  In: 4877.1 [P.O.:1200; I.V.:3677.1]  Out: 2450 [Urine:1800]    Lab/Data Reviewed:  BMP:   Lab Results   Component Value Date/Time     01/16/2021 04:35 AM    K 3.5 01/16/2021 04:35 AM     01/16/2021 04:35 AM    CO2 33 (H) 01/16/2021 04:35 AM    AGAP 5 01/16/2021 04:35 AM    GLU 75 01/16/2021 04:35 AM    BUN 54 (H) 01/16/2021 04:35 AM    CREA 4.48 (H) 01/16/2021 04:35 AM    GFRAA 16 (L) 01/16/2021 04:35 AM    GFRNA 13 (L) 01/16/2021 04:35 AM     CBC:   No results found for: WBC, HGB, HGBEXT, HCT, HCTEXT, PLT, PLTEXT, HGBEXT, HCTEXT, PLTEXT    Imaging Reviewed:  No results found.     Medications Reviewed:  Current Facility-Administered Medications   Medication Dose Route Frequency    phosphorus (K PHOS NEUTRAL) 250 mg tablet 1 Tab  1 Tab Oral TID    levoFLOXacin (LEVAQUIN) tablet 500 mg  500 mg Oral Q48H    0.9% sodium chloride with KCl 20 mEq/L infusion   IntraVENous CONTINUOUS    piperacillin-tazobactam (ZOSYN) 3.375 g in 0.9% sodium chloride (MBP/ADV) 100 mL MBP - Extended 4 hour infusion###  3.375 g IntraVENous Q8H    enoxaparin (LOVENOX) injection 30 mg  30 mg SubCUTAneous Q24H    aspirin delayed-release tablet 81 mg  81 mg Oral DAILY    sodium chloride (NS) flush 5-40 mL  5-40 mL IntraVENous Q8H    sodium chloride (NS) flush 5-40 mL  5-40 mL IntraVENous PRN    acetaminophen (TYLENOL) tablet 650 mg 650 mg Oral Q6H PRN    Or    acetaminophen (TYLENOL) suppository 650 mg  650 mg Rectal Q6H PRN    polyethylene glycol (MIRALAX) packet 17 g  17 g Oral DAILY PRN    promethazine (PHENERGAN) tablet 12.5 mg  12.5 mg Oral Q6H PRN    Or    ondansetron (ZOFRAN) injection 4 mg  4 mg IntraVENous Q6H PRN         Silvia Vallecillo PA-C  [facility]  Hospitalist Division  Office:  [phone]  Pager: [phone]

## 2021-01-16 NOTE — PROGRESS NOTES
5351- Bedside and Verbal shift change report given to ABHIJIT Haque (oncoming nurse) by Liseth Rodrigues RN (offgoing nurse). Report included the following information SBAR, Kardex, Intake/Output and Recent Results. 6740- AM meds given. Pt resting in bed, with no complaints of pain. 1047- New bag of continuous fluids started. 1155- Antibiotic given. 1550- Reassessment. No changes from previous assessment. 1655- PM meds given. 1932- Bedside and Verbal shift change report given to Liseth Rodrigues RN (oncoming nurse) by Henny Lopez RN (offgoing nurse). Report included the following information SBAR, Kardex, Intake/Output, MAR and Recent Results.

## 2021-01-16 NOTE — PROGRESS NOTES
Bedside shift report received from Ridge Vargas RN    6064: Tariq Herzog, on room air with regular, nonlabored breathing; watching tv; denies any pain or other discomforts; IVF infusing well; shift assessment done; instructed to call for needs; due atb given          : had 200 ml of greenish loose stools    2040: watching tv; IVF infusing well    2130: resting in bed watching tv    2248: v/s checked; had 200 ml of greenish loose stools    0005: resting in bed, needs within reach    0200: sleeping; IVF infusing well    0421: v/s checked; no needs voiced    0600: resting in bed, IVF infusing well    0720: Bedside and Verbal shift change report given to Blake Watts 1 and Paulette Delatorre RN (oncoming nurse) by Neil Yeh RN (offgoing nurse). Report included the following information SBAR, Kardex, Intake/Output and Recent Results.

## 2021-01-16 NOTE — PROGRESS NOTES
RENAL PROGRESS NOTE        Dimas Lanes         Assessment/Plan:   · YOLIE due to volume depletion with possible progression to ischemic atn/rhabdomyolysis. Use of ACE I prior to admission [facility] Tenofovir also has been associated with yolie. Nonoliguric, scr is up slightly but may be stabilizing. Continue ivf. · Hypokalemia. Continue replacement. · Rhabdomyolysis, improving. As far as etio- suspect volume depletion/statin/and possibly art. · HTN, h/o off. BP is currently stable off antihtn. Continue to hold lisinopril. · HIV. ART is on hold due to severe yolie/low gfr. · Hypophosphatemia. Replace. · Acute gastroenteritis, mgmt is deferred to primary team.  · Episode of fever, on abx. · Will f/u on Monday, please call if any questions. Subjective:  Patient complaints off:  Feels better, diarrhea is improving. No SOB/CP/N/V. Tolerates diet.        Patient Active Problem List   Diagnosis Code    Rhabdomyolysis M62.82    YOLIE (acute [facility] injury) ([facility] N17.9    HIV (human immunodeficiency virus infection) ([facility] B20    Diarrhea R19.7    Hypokalemia E87.6       Current Facility-Administered Medications   Medication Dose Route Frequency Provider Last Rate Last Admin    0.9% sodium chloride with KCl 20 mEq/L infusion   IntraVENous CONTINUOUS Reprogle, LUIS Silveira 125 mL/hr at 01/16/21 1047 New Bag at 01/16/21 1047    piperacillin-tazobactam (ZOSYN) 3.375 g in 0.9% sodium chloride (MBP/ADV) 100 mL MBP - Extended 4 hour infusion###  3.375 g IntraVENous Q8H Trent Barillas MD 25 mL/hr at 01/16/21 1155 3.375 g at 01/16/21 1155    levoFLOXacin (LEVAQUIN) 500 mg in D5W IVPB  500 mg IntraVENous Q48H Trent Barillas  mL/hr at 01/16/21 0040 500 mg at 01/16/21 0040    enoxaparin (LOVENOX) injection 30 mg  30 mg SubCUTAneous Q24H Trent Barillas MD   30 mg at 01/16/21 0842    aspirin delayed-release tablet 81 mg  81 mg Oral DAILY Pegge Nyhan, MD   81 mg at 01/16/21 5681    sodium chloride (NS) flush 5-40 mL  5-40 mL IntraVENous Q8H Pegge Nyhan, MD   10 mL at 01/16/21 2541    sodium chloride (NS) flush 5-40 mL  5-40 mL IntraVENous PRN Pegge Nyhan, MD        acetaminophen (TYLENOL) tablet 650 mg  650 mg Oral Q6H PRN Pegge Nyhan, MD   650 mg at 01/13/21 2312    Or    acetaminophen (TYLENOL) suppository 650 mg  650 mg Rectal Q6H PRN Pegge Nyhan, MD        polyethylene glycol (MIRALAX) packet 17 g  17 g Oral DAILY PRN Pegge Nyhan, MD        promethazine (PHENERGAN) tablet 12.5 mg  12.5 mg Oral Q6H PRN Pegge Nyhan, MD        Or    ondansetron [facility]) injection 4 mg  4 mg IntraVENous Q6H PRN Pegge Nyhan, MD           Objective  Vitals:    01/16/21 0421 01/16/21 0422 01/16/21 0806 01/16/21 1032   BP: 110/74  111/76 106/72   Pulse: 64  (!) 56 60   Resp: 20  18 20   Temp: 98.2 °F (36.8 °C)  98.2 °F (36.8 °C) 98.1 °F (36.7 °C)   SpO2: 96%  97% 99%   Weight:  83.5 kg (184 lb)     Height:             Intake/Output Summary (Last 24 hours) at 1/16/2021 1216  Last data filed at 1/16/2021 1205  Gross per 24 hour   Intake 1940 ml   Output 2050 ml   Net -110 ml           Admission weight: Weight: 79.2 kg (174 lb 11.2 oz) (01/14/21 0940)  Last Weight Metrics:  Weight Loss Metrics 1/16/2021 10/11/2020 4/9/2019 11/17/2016   Today's Wt 184 lb 171 lb 176 lb 164 lb   BMI 27.17 kg/m2 25.25 kg/m2 - -             Physical Assessment:     General: NAD, alert and oriented. Neck: No jvd. LUNGS: Clear to Auscultation, No rales, rhonchi or wheezes. CVS EXM: S1, S2  RRR, no murmurs/gallops/rubs. Abdomen: soft, non tender. Lower Extremities:  no edema.        Lab    CBC w/Diff Recent Labs     01/14/21  0350 01/13/21  1230   WBC 2.5* 5.0   RBC 4.57* 5.61*   HGB 14.4 18.0*   HCT 41.6 52.5*   * 130*   GRANS 42 41   LYMPH 38 31   EOS 0 0        Chemistry Recent Labs     01/16/21  0435 01/15/21  0340 01/14/21  0350   GLU 75 97 115*    140 137   K 3.5 2.8* 3.2*    96* 102   CO2 33* 38* 25   BUN 54* 53* 47*   CREA 4.48* 4.38* 3.43*   CA 9.1 8.8 8.7   AGAP 5 6 10   BUCR 12 12 14   ALB 1.8*  --   --    PHOS 1.9*  --  2.1*         No results found for: IRON, FE, TIBC, IBCT, PSAT, FERR   Lab Results   Component Value Date/Time    Calcium 9.1 01/16/2021 04:35 AM    Phosphorus 1.9 (L) 01/16/2021 04:35 AM        Delisa Rojas M.D.   [facility]  Phone

## 2021-01-17 PROBLEM — R78.81 GRAM-NEGATIVE BACTEREMIA: Status: ACTIVE | Noted: 2021-01-17

## 2021-01-17 LAB
ALBUMIN SERPL-MCNC: 2 G/DL (ref 3.4–5)
ANION GAP SERPL CALC-SCNC: 8 MMOL/L (ref 3–18)
BUN SERPL-MCNC: 52 MG/DL (ref 7–18)
BUN/CREAT SERPL: 12 (ref 12–20)
CALCIUM SERPL-MCNC: 8.7 MG/DL (ref 8.5–10.1)
CHLORIDE SERPL-SCNC: 111 MMOL/L (ref 100–111)
CO2 SERPL-SCNC: 28 MMOL/L (ref 21–32)
CREAT SERPL-MCNC: 4.33 MG/DL (ref 0.6–1.3)
GLUCOSE SERPL-MCNC: 74 MG/DL (ref 74–99)
PHOSPHATE SERPL-MCNC: 3.7 MG/DL (ref 2.5–4.9)
POTASSIUM SERPL-SCNC: 3.6 MMOL/L (ref 3.5–5.5)
SODIUM SERPL-SCNC: 147 MMOL/L (ref 136–145)

## 2021-01-17 PROCEDURE — 65270000029 HC RM PRIVATE

## 2021-01-17 PROCEDURE — 74011250636 HC RX REV CODE- 250/636: Performed by: HOSPITALIST

## 2021-01-17 PROCEDURE — 80069 RENAL FUNCTION PANEL: CPT

## 2021-01-17 PROCEDURE — 74011000258 HC RX REV CODE- 258: Performed by: HOSPITALIST

## 2021-01-17 PROCEDURE — 74011250637 HC RX REV CODE- 250/637: Performed by: INTERNAL MEDICINE

## 2021-01-17 PROCEDURE — 36415 COLL VENOUS BLD VENIPUNCTURE: CPT

## 2021-01-17 PROCEDURE — 87040 BLOOD CULTURE FOR BACTERIA: CPT

## 2021-01-17 PROCEDURE — 74011250636 HC RX REV CODE- 250/636: Performed by: PHYSICIAN ASSISTANT

## 2021-01-17 RX ORDER — POTASSIUM CHLORIDE AND SODIUM CHLORIDE 450; 150 MG/100ML; MG/100ML
INJECTION, SOLUTION INTRAVENOUS CONTINUOUS
Status: DISCONTINUED | OUTPATIENT
Start: 2021-01-17 | End: 2021-01-20

## 2021-01-17 RX ORDER — LEVOFLOXACIN 5 MG/ML
500 INJECTION, SOLUTION INTRAVENOUS
Status: DISCONTINUED | OUTPATIENT
Start: 2021-01-18 | End: 2021-01-19

## 2021-01-17 RX ADMIN — SODIUM CHLORIDE AND POTASSIUM CHLORIDE: 4.5; 1.49 INJECTION, SOLUTION INTRAVENOUS at 22:53

## 2021-01-17 RX ADMIN — Medication 10 ML: at 16:15

## 2021-01-17 RX ADMIN — Medication 10 ML: at 05:27

## 2021-01-17 RX ADMIN — PIPERACILLIN AND TAZOBACTAM 3.38 G: 3; .375 INJECTION, POWDER, LYOPHILIZED, FOR SOLUTION INTRAVENOUS at 03:40

## 2021-01-17 RX ADMIN — DIBASIC SODIUM PHOSPHATE, MONOBASIC POTASSIUM PHOSPHATE AND MONOBASIC SODIUM PHOSPHATE 1 TABLET: 852; 155; 130 TABLET ORAL at 16:15

## 2021-01-17 RX ADMIN — Medication 10 ML: at 22:55

## 2021-01-17 RX ADMIN — ASPIRIN 81 MG: 81 TABLET, COATED ORAL at 09:36

## 2021-01-17 RX ADMIN — DIBASIC SODIUM PHOSPHATE, MONOBASIC POTASSIUM PHOSPHATE AND MONOBASIC SODIUM PHOSPHATE 1 TABLET: 852; 155; 130 TABLET ORAL at 22:55

## 2021-01-17 RX ADMIN — PIPERACILLIN AND TAZOBACTAM 3.38 G: 3; .375 INJECTION, POWDER, LYOPHILIZED, FOR SOLUTION INTRAVENOUS at 11:55

## 2021-01-17 RX ADMIN — PIPERACILLIN AND TAZOBACTAM 3.38 G: 3; .375 INJECTION, POWDER, LYOPHILIZED, FOR SOLUTION INTRAVENOUS at 23:24

## 2021-01-17 RX ADMIN — SODIUM CHLORIDE AND POTASSIUM CHLORIDE: 4.5; 1.49 INJECTION, SOLUTION INTRAVENOUS at 11:19

## 2021-01-17 RX ADMIN — DIBASIC SODIUM PHOSPHATE, MONOBASIC POTASSIUM PHOSPHATE AND MONOBASIC SODIUM PHOSPHATE 1 TABLET: 852; 155; 130 TABLET ORAL at 09:36

## 2021-01-17 RX ADMIN — ENOXAPARIN SODIUM 30 MG: 30 INJECTION SUBCUTANEOUS at 09:35

## 2021-01-17 NOTE — PROGRESS NOTES
Internal Medicine Progress Note    Patient's Name: Adryan Borjas  Admit Date: 1/13/2021  Length of Stay: 4      Assessment/Plan     Principal Problem:    Rhabdomyolysis (1/13/2021)    Active Problems:    YOLIE (acute kidney injury) ([facility] (1/14/2021)      HIV (human immunodeficiency virus infection) ([facility] (1/14/2021)      Diarrhea (1/14/2021)      Hypokalemia (1/15/2021)      Shigella gastroenteritis (1/16/2021)      Gram-negative bacteremia (1/17/2021)      Rhabdo/YOLIE  - continue IV fluids - switch to 1/2NS +K because Na trending up  - monitor BMP - Cr down slightly   - CK trending down  - appreciate nephrology    Shigella gastroenteritis, GNR bacteremia  - cont IV zosyn, levaquin  - ID consult - appreciate assistance  - 1/2 BCx resulted 1/15 with GNR   - repeat BCx 1/17    HIV  - d/c genvoya due to YOLIE    Hypokalemia  - 2/2 diarrhea  - replace and recheck      - Cont acceptable home medications for chronic conditions   - DVT protocol    I have personally reviewed all pertinent labs and films that have officially resulted over the last 24 hours. I have personally checked for all pending labs that are awaiting final results. Anticipated discharge: >2 days    HPI     Adryan Borjas is a 63 y.o.  male who presents with diarrhea and weakness. He has a hx hiv with compliance with haart. Was in usual state of health until Monday evening, started diarrhea, watery, severe. Pt has continued with this for last 2 days, also occasional vomiting, thought this was food poisoning. He is found to have rhabdo in ed, with yolie. Pt denies being down on floor, denies alcoholism, no new meds, no injury/trauma, no harsh workouts or marathons, no cocaine. He continues to have diarrhea. Hospital Course     He was started on aggressive IVF. C diff negative. Nephrology consulted as renal function continued to worsen. Further stool studies confirmed shigella species. 1/2 BCx +GNR. Repeat cultures ordered.  ID consulted. Subjective     Pt s/e @ bedside. No major events overnight. Pt feels good today. No complaints. Denies abd pain. Denies myalgias. Feels a little dehydrated. Objective     Visit Vitals  /86   Pulse (!) 58   Temp 97.6 °F (36.4 °C)   Resp 18   Ht 5' 9\" (1.753 m)   Wt 84.8 kg (187 lb)   SpO2 96%   BMI 27.62 kg/m²       Physical Exam:  General Appearance: NAD, conversant  HENT: normocephalic/atraumatic, moist mucus membranes  Lungs: CTA with normal respiratory effort  CV: RRR, no m/r/g  Abdomen: soft, non-tender, normal bowel sounds  Extremities: no cyanosis, no peripheral edema  Neuro: No focal deficits, motor/sensory intact  Skin: Normal color, intact      Intake and Output:  Current Shift:  01/17 0701 - 01/17 1900  In: 180 [P.O.:180]  Out: 475 [Urine:475]  Last three shifts:  01/15 1901 - 01/17 0700  In: 5020 [P.O.:1620; I.V.:3400]  Out: 0851 [Urine:3050]    Lab/Data Reviewed:  BMP:   Lab Results   Component Value Date/Time     (H) 01/17/2021 03:41 AM    K 3.6 01/17/2021 03:41 AM     01/17/2021 03:41 AM    CO2 28 01/17/2021 03:41 AM    AGAP 8 01/17/2021 03:41 AM    GLU 74 01/17/2021 03:41 AM    BUN 52 (H) 01/17/2021 03:41 AM    CREA 4.33 (H) 01/17/2021 03:41 AM    GFRAA 17 (L) 01/17/2021 03:41 AM    GFRNA 14 (L) 01/17/2021 03:41 AM     CBC:   No results found for: WBC, HGB, HGBEXT, HCT, HCTEXT, PLT, PLTEXT, HGBEXT, HCTEXT, PLTEXT    Imaging Reviewed:  No results found.     Medications Reviewed:  Current Facility-Administered Medications   Medication Dose Route Frequency    0.45% sodium chloride with KCl 20 mEq/L infusion   IntraVENous CONTINUOUS    [START ON 1/18/2021] levoFLOXacin (LEVAQUIN) 500 mg in D5W IVPB  500 mg IntraVENous Q48H    phosphorus (K PHOS NEUTRAL) 250 mg tablet 1 Tab  1 Tab Oral TID    piperacillin-tazobactam (ZOSYN) 3.375 g in 0.9% sodium chloride (MBP/ADV) 100 mL MBP - Extended 4 hour infusion###  3.375 g IntraVENous Q8H    enoxaparin (LOVENOX) injection 30 mg  30 mg SubCUTAneous Q24H    aspirin delayed-release tablet 81 mg  81 mg Oral DAILY    sodium chloride (NS) flush 5-40 mL  5-40 mL IntraVENous Q8H    sodium chloride (NS) flush 5-40 mL  5-40 mL IntraVENous PRN    acetaminophen (TYLENOL) tablet 650 mg  650 mg Oral Q6H PRN    Or    acetaminophen (TYLENOL) suppository 650 mg  650 mg Rectal Q6H PRN    polyethylene glycol (MIRALAX) packet 17 g  17 g Oral DAILY PRN    promethazine (PHENERGAN) tablet 12.5 mg  12.5 mg Oral Q6H PRN    Or    ondansetron (ZOFRAN) injection 4 mg  4 mg IntraVENous Q6H PRN         Adrien Mac PA-C  [address] [place] [address]  Hospitalist Division  Office:  [phone]  Pager: [phone]

## 2021-01-17 NOTE — PROGRESS NOTES
Bedside shift report received from Tsering Dumont RN and Maggie Ward RN    8196: AOX4, on room air, resting in bed watching tv; denies any pain or other discomforts; IVF infusing well; vital signs monitoring done    2117: ; po liquids given as requested; urinal emptied    2142: med given as scheduled    2331: had a medium amount of soft brownish stools; urinal emptied; pt resting, no needs voiced    0030: conversing over phone    0200: sleeping; needs within reach; IVF infusing well    0340: due Zosyn given; awake; no needs voiced    0530: resting in bed    0720: awake; IVF infusing well; no needs voiced    Bedside and Verbal shift change report given to AV Rios RN and Maggie Ward RN (oncoming nurse) by Branden Lopez RN (offgoing nurse). Report included the following information SBAR, Kardex, Intake/Output and Recent Results.

## 2021-01-18 LAB
ALBUMIN SERPL-MCNC: 2.2 G/DL (ref 3.4–5)
ANION GAP SERPL CALC-SCNC: 11 MMOL/L (ref 3–18)
BASOPHILS # BLD: 0 K/UL (ref 0–0.1)
BASOPHILS NFR BLD: 0 % (ref 0–2)
BUN SERPL-MCNC: 50 MG/DL (ref 7–18)
BUN/CREAT SERPL: 12 (ref 12–20)
CALCIUM SERPL-MCNC: 8.6 MG/DL (ref 8.5–10.1)
CHLORIDE SERPL-SCNC: 113 MMOL/L (ref 100–111)
CO2 SERPL-SCNC: 25 MMOL/L (ref 21–32)
CREAT SERPL-MCNC: 4.33 MG/DL (ref 0.6–1.3)
DIFFERENTIAL METHOD BLD: ABNORMAL
EOSINOPHIL # BLD: 0 K/UL (ref 0–0.4)
EOSINOPHIL NFR BLD: 0 % (ref 0–5)
ERYTHROCYTE [DISTWIDTH] IN BLOOD BY AUTOMATED COUNT: 13.6 % (ref 11.6–14.5)
GLUCOSE SERPL-MCNC: 82 MG/DL (ref 74–99)
HCT VFR BLD AUTO: 43.1 % (ref 36–48)
HGB BLD-MCNC: 14.5 G/DL (ref 13–16)
LYMPHOCYTES # BLD: 1.9 K/UL (ref 0.9–3.6)
LYMPHOCYTES NFR BLD: 18 % (ref 21–52)
MCH RBC QN AUTO: 31.4 PG (ref 24–34)
MCHC RBC AUTO-ENTMCNC: 33.6 G/DL (ref 31–37)
MCV RBC AUTO: 93.3 FL (ref 74–97)
MONOCYTES # BLD: 1.3 K/UL (ref 0.05–1.2)
MONOCYTES NFR BLD: 12 % (ref 3–10)
NEUTS SEG # BLD: 7.7 K/UL (ref 1.8–8)
NEUTS SEG NFR BLD: 70 % (ref 40–73)
PHOSPHATE SERPL-MCNC: 5.1 MG/DL (ref 2.5–4.9)
PLATELET # BLD AUTO: 146 K/UL (ref 135–420)
PMV BLD AUTO: 10.7 FL (ref 9.2–11.8)
POTASSIUM SERPL-SCNC: 3.8 MMOL/L (ref 3.5–5.5)
RBC # BLD AUTO: 4.62 M/UL (ref 4.7–5.5)
SODIUM SERPL-SCNC: 149 MMOL/L (ref 136–145)
WBC # BLD AUTO: 11 K/UL (ref 4.6–13.2)

## 2021-01-18 PROCEDURE — 36415 COLL VENOUS BLD VENIPUNCTURE: CPT

## 2021-01-18 PROCEDURE — 80069 RENAL FUNCTION PANEL: CPT

## 2021-01-18 PROCEDURE — 74011250637 HC RX REV CODE- 250/637: Performed by: INTERNAL MEDICINE

## 2021-01-18 PROCEDURE — 74011000258 HC RX REV CODE- 258: Performed by: HOSPITALIST

## 2021-01-18 PROCEDURE — 74011250636 HC RX REV CODE- 250/636: Performed by: HOSPITALIST

## 2021-01-18 PROCEDURE — 65270000029 HC RM PRIVATE

## 2021-01-18 PROCEDURE — 85025 COMPLETE CBC W/AUTO DIFF WBC: CPT

## 2021-01-18 PROCEDURE — 74011250636 HC RX REV CODE- 250/636: Performed by: PHYSICIAN ASSISTANT

## 2021-01-18 RX ORDER — AMLODIPINE BESYLATE 5 MG/1
5 TABLET ORAL DAILY
Status: DISCONTINUED | OUTPATIENT
Start: 2021-01-18 | End: 2021-01-20

## 2021-01-18 RX ADMIN — DIBASIC SODIUM PHOSPHATE, MONOBASIC POTASSIUM PHOSPHATE AND MONOBASIC SODIUM PHOSPHATE 1 TABLET: 852; 155; 130 TABLET ORAL at 09:14

## 2021-01-18 RX ADMIN — Medication 10 ML: at 05:11

## 2021-01-18 RX ADMIN — LEVOFLOXACIN 500 MG: 5 INJECTION, SOLUTION INTRAVENOUS at 04:03

## 2021-01-18 RX ADMIN — ENOXAPARIN SODIUM 30 MG: 30 INJECTION SUBCUTANEOUS at 09:14

## 2021-01-18 RX ADMIN — SODIUM CHLORIDE AND POTASSIUM CHLORIDE: 4.5; 1.49 INJECTION, SOLUTION INTRAVENOUS at 09:19

## 2021-01-18 RX ADMIN — AMLODIPINE BESYLATE 5 MG: 5 TABLET ORAL at 13:06

## 2021-01-18 RX ADMIN — ASPIRIN 81 MG: 81 TABLET, COATED ORAL at 09:14

## 2021-01-18 RX ADMIN — Medication 10 ML: at 23:22

## 2021-01-18 RX ADMIN — PIPERACILLIN AND TAZOBACTAM 3.38 G: 3; .375 INJECTION, POWDER, LYOPHILIZED, FOR SOLUTION INTRAVENOUS at 12:42

## 2021-01-18 NOTE — PROGRESS NOTES
1006- Bedside and Verbal shift change report given to ABHIJIT Najera (oncoming nurse) by Christy Lou RN (offgoing nurse). Report included the following information SBAR, Kardex, Intake/Output, MAR and Recent Results. 7879- AM meds given. Pt resting in bed, no signs of distress or complaints of pain. 1119- New continuous fluids given. 1155- Antibiotic hung. 1615- PM meds given. 1944- Bedside and Verbal shift change report given to Kush Mulligan RN (oncoming nurse) by ABHIJIT Najera (offgoing nurse). Report included the following information SBAR, Kardex, Intake/Output, MAR and Recent Results.

## 2021-01-18 NOTE — CONSULTS
[facility]  (A Division of [address] [place] [address])      Consultation Note      Date of Admission: 1/13/2021    Date of Consultation: 1/18/2021    Referred by: Dr. Brendan Pisano    Reason for Referral: Shigella gastroenteritis, GNB bacteremia      Current Antimicrobials:    Prior Antimicrobials:  Zosyn, Levofloxacin 1/14 - 4        Assessment: Rec / Plan:   Acute Shigella enterocolitis   - stool enteric panel positive for Shigella, negative for Shigatoxin  - improving on abx rx -> dc zosyn  -> continue Levofloxacin q 48 hours, change to po  -> OK to discharge from ID standpoint when renal function is improved to continue po levofloxacin until 1/28   BSI Shigella  - blcx 1/13 1 of 2 + Shigella flexneri sensitive to quinolones -> abx as above   YOLIE   - cr stable at 4.3 -> per Renal   Rhabdomyolysis  - CK 13K ->-> 339 -> monitor   HIV  - followed by Dr. Shahid Bench -> hold ARV until renal function improves         Microbiology:      Lines / Catheters:      HPI:  63 y.o. male with  HIV, admitted 1/13 with severe, watery diarrhea and generalized weakness, as well as some vomiting. He works as a  and began having diarrhea on the evening of 1/11. He had one episode of vomiting and and came to the ED on 1/13. Found to have fever 102.7, rhabdomyolysis and YOLIE, he was started on IV fluids and admitted. Stool enteric panel was positive for Shigella and he was started on Zosyn and Levofloxacin. The diarrhea has improved significantly, now occurring each time he has to urinate, around six times a day. He denies any pain, nausea, vomiting, fever, chills, night sweats, SOB, or cough. No other acute concerns.      Active Hospital Problems    Diagnosis Date Noted    Gram-negative bacteremia 01/17/2021    Shigella gastroenteritis 01/16/2021    Hypokalemia 01/15/2021    [facility] 01/14/2021    HIV (human immunodeficiency virus infection) ([facility] 01/14/2021    Diarrhea 01/14/2021    Rhabdomyolysis 01/13/2021     Past Medical History:   Diagnosis Date    High cholesterol     [facility] immunodeficiency virus infection) [facility]      History reviewed. No pertinent surgical history. History reviewed. No pertinent family history. Social History     Socioeconomic History    Marital status: SINGLE     Spouse name: Not on file    Number of children: Not on file    Years of education: Not on file    Highest education level: Not on file   Occupational History    Not on file   Social Needs    Financial resource strain: Not on file    Food insecurity     Worry: Not on file     Inability: Not on file    Transportation needs     Medical: Not on file     Non-medical: Not on file   Tobacco Use    Smoking status: Never Smoker   Substance and Sexual Activity    Alcohol use: Yes     Comment: socially    Drug use: Not on file    Sexual activity: Not on file   Lifestyle    Physical activity     Days per week: Not on file     Minutes per session: Not on file    Stress: Not on file   Relationships    Social connections     Talks on phone: Not on file     Gets together: Not on file     Attends Shinto service: Not on file     Active member of club or organization: Not on file     Attends meetings of clubs or organizations: Not on file     Relationship status: Not on file    Intimate partner violence     Fear of current or ex partner: Not on file     Emotionally abused: Not on file     Physically abused: Not on file     Forced sexual activity: Not on file   Other Topics Concern    Not on file   Social History Narrative    Not on file       Allergies:  Patient has no known allergies.      Medications:  Current Facility-Administered Medications   Medication Dose Route Frequency    amLODIPine (NORVASC) tablet 5 mg  5 mg Oral DAILY    0.45% sodium chloride with KCl 20 mEq/L infusion   IntraVENous CONTINUOUS    levoFLOXacin (LEVAQUIN) 500 mg in D5W IVPB  500 mg IntraVENous Q48H    enoxaparin (LOVENOX) injection 30 mg  30 mg SubCUTAneous Q24H    aspirin delayed-release tablet 81 mg  81 mg Oral DAILY    sodium chloride (NS) flush 5-40 mL  5-40 mL IntraVENous Q8H    sodium chloride (NS) flush 5-40 mL  5-40 mL IntraVENous PRN    acetaminophen (TYLENOL) tablet 650 mg  650 mg Oral Q6H PRN    Or    acetaminophen (TYLENOL) suppository 650 mg  650 mg Rectal Q6H PRN    polyethylene glycol (MIRALAX) packet 17 g  17 g Oral DAILY PRN    promethazine (PHENERGAN) tablet 12.5 mg  12.5 mg Oral Q6H PRN    Or    ondansetron (ZOFRAN) injection 4 mg  4 mg IntraVENous Q6H PRN        ROS:  A comprehensive review of systems was negative except for that written in the History of Present Illness. Physical Exam:    HPI:  Temp (24hrs), Av.3 °F (36.8 °C), Min:96.8 °F (36 °C), Max:99.4 °F (37.4 °C)    Visit Vitals  BP (!) 147/78 (BP 1 Location: Right arm, BP Patient Position: At rest)   Pulse 62   Temp 98 °F (36.7 °C)   Resp 18   Ht 5' 9\" (1.753 m)   Wt 84.8 kg (187 lb)   SpO2 97%   BMI 27.62 kg/m²     General: Well developed, well nourished 63 y.o. BLACK OR  male in no acute distress. ENT: Throat normal without erythema or exudate  Head: normocephalic, without obvious abnormality, atraumatic  Mouth:  mucous membranes moist, pharynx normal without lesions  Neck: supple, symmetrical, trachea midline   Cardio:  regular rate and rhythm, S1, S2 normal, no murmur, click, rub or gallop  Chest: inspection normal - no chest wall deformities or tenderness, respiratory effort normal, symmetric  Lungs: clear to auscultation, no wheezes or rales and unlabored breathing  Abdomen: Increased bowel sounds. Soft, non-tender. No distension or guarding. No masses, no organomegaly. Extremities:  no redness or tenderness in the calves or thighs, no edema  Neuro: Alert and interactive, answers all questions appropriately; motor and sensory grossly intact.        Lab results:    Chemistry  Recent Labs 01/18/21  0415 01/17/21  0341 01/16/21  0435   GLU 82 74 75   * 147* 143   K 3.8 3.6 3.5   * 111 105   CO2 25 28 33*   BUN 50* 52* 54*   CREA 4.33* 4.33* 4.48*   CA 8.6 8.7 9.1   AGAP 11 8 5   BUCR 12 12 12   ALB 2.2* 2.0* 1.8*       CBC w/ Diff  Recent Labs     01/18/21  0415   WBC 11.0   RBC 4.62*   HGB 14.5   HCT 43.1      GRANS 70   LYMPH 18*   EOS 0       Microbiology  All Micro Results     Procedure Component Value Units Date/Time    CULTURE, BLOOD [[id number]]  (Abnormal)  (Susceptibility) Collected: 01/14/21 0017    Order Status: Completed Specimen: Blood Updated: 01/18/21 1302     Special Requests: NO SPECIAL REQUESTS        GRAM STAIN GRAM NEGATIVE RODS PEDS                SMEAR CALLED TO AND CORRECTLY REPEATED BY: [id number] AT 9343,19/29/49 TO ETT.            Culture result:       SHIGELLA SPECIES TYPING AS SHIGELLA FLEXNERI GROWING IN THE PEDIATRIC BOTTLE (SITE = L. ARM) Reported to Dept of [address] [place] [address] [[id number]] Collected: 01/17/21 0854    Order Status: Completed Specimen: Blood Updated: 01/18/21 0756     Special Requests: RIGHT ARM        Culture result: NO GROWTH AFTER 22 HOURS       CULTURE, BLOOD [[id number]] Collected: 01/17/21 0854    Order Status: Completed Specimen: Blood Updated: 01/18/21 0756     Special Requests: LEFT ARM        Culture result: NO GROWTH AFTER 22 HOURS       CULTURE, BLOOD [[id number]] Collected: 01/14/21 0015    Order Status: Completed Specimen: Blood Updated: 01/18/21 0756     Special Requests: NO SPECIAL REQUESTS        Culture result: NO GROWTH 4 DAYS       ENTERIC BACTERIA PANEL, DNA [[id number]]  (Abnormal) Collected: 01/14/21 1730    Order Status: Completed Specimen: Stool Updated: 01/16/21 1314     Shigella species, DNA Positive        Campylobacter species, DNA Negative        Vibrio species, DNA Negative        Enterotoxigen E Coli, DNA Negative        Shiga toxin producing, DNA Negative        Salmonella species, DNA Negative        P. shigelloides, DNA Negative        Y. enterocolitica, DNA Negative       CULTURE, URINE [[id number]] Collected: 01/13/21 2040    Order Status: Completed Specimen: Urine from Clean catch Updated: 01/15/21 1325     Special Requests: NO SPECIAL REQUESTS        Culture result: No growth (<1,000 CFU/ML)       OVA & PARASITES, STOOL [[id number]] Collected: 01/14/21 1730    Order Status: Completed Specimen: Feces from Stool Updated: 01/14/21 1921    C. DIFFICILE AG & TOXIN A/B [[id number]] Collected: 01/13/21 2040    Order Status: Completed Specimen: Stool Updated: 01/14/21 0859     [address] Anthony Mccloud ANTIGEN Negative        C. difficile toxin Negative        INTERPRETATION       NEGATIVE FOR TOXIGENIC C. DIFFICILE                   Julissa Ross MD, [facility]  1/18/2021   3:23 PM

## 2021-01-18 NOTE — MED STUDENT NOTES
*ATTENTION:  This note has been created by a medical student for educational purposes only. Please do not refer to the content of this note for clinical decision-making, billing, or other purposes. Please see attending physicians note to obtain clinical information on this patient. * [facility] 
(A Division of [address] [place] [address]) Consultation Note Date of Admission: 1/13/2021    Date of Consultation: 1/18/2021 Referred by: Anthony Merida PA-C Reason for Referral: GNR bacteremia Current Antimicrobials:    Prior Antimicrobials: 
Zosyn - started on 1/17 at 2324 Levaquin - started on 1/18 0403 N/A Assessment: Rec / Plan:  
GNR bacteremia - Positive blcx for GNR in 1/2 bottles on 1/14 
- Blcx's drawn on 1/17 show no growth at 22 hours in 2/2 
- No fever, tachycardia, WBC WNL (11.0 currently, up from 2.5 yesterday) Continue Zosyn / Levaquin Monitor vitals for worsening clinical picture / development of sepsis Shigella gastroenteritis - Enteric bacteria panel positive for Shigella species DNA 
- 5 episodes of watery, non-bloody, mucoid diarrhea in the past 12 hours Continue antibiotic therapy as above Continue IV hydration with 1/2 nml saline KCL 20mEq at 100 ml/hr Education on frequent handwashing for prevention of transmission HIV Currently holding Tenofovir due to YOLIE Will need to resume once kidney function improves Microbiology: 
Blcx 1/2 positive for GNR drawn on 1/14 Blcx 2/2 no growth at 22 hours on 1/17 Urcx no growth on 1/13 C. Diff ag and toxin A/B negative on 1/13 Lines / Catheters: PIV x 2 for 4 days - right antecubital and posterior right hand HPI: 
 Esperanza Rodriguez, a 63 y.o. male with a history of HIV, presented on 1/13 with severe, watery diarrhea and generalized weakness, as well as some vomiting. The diarrhea started on the evening of 1/11. He was seen in the ED on 1/13 and dx'ed with rhabdomyolysis and YOLIE. He was subsequently admitted to Internal Medicine for further workup and management. Currently, the patient states that he is still having diarrhea, has had about 4-5 episodes of watery, green in color bowel movements. He denies any pain, nausea, vomiting, fever, chills, night sweats, SOB, or cough. No other acute concerns. Active Hospital Problems Diagnosis Date Noted  Gram-negative bacteremia 01/17/2021  Shigella gastroenteritis 01/16/2021  Hypokalemia 01/15/2021  YOLIE (acute kidney injury) ([facility] 01/14/2021  
 HIV (human immunodeficiency virus infection) ([facility] 01/14/2021  Diarrhea 01/14/2021  Rhabdomyolysis 01/13/2021 Past Medical History:  
Diagnosis Date  High cholesterol  HIV (human immunodeficiency virus infection) ([facility] History reviewed. No pertinent surgical history. History reviewed. No pertinent family history. Social History Socioeconomic History  Marital status: SINGLE Spouse name: Not on file  Number of children: Not on file  Years of education: Not on file  Highest education level: Not on file Occupational History  Not on file Social Needs  Financial resource strain: Not on file  Food insecurity Worry: Not on file Inability: Not on file  Transportation needs Medical: Not on file Non-medical: Not on file Tobacco Use  Smoking status: Never Smoker Substance and Sexual Activity  Alcohol use: Yes Comment: socially  Drug use: Not on file  Sexual activity: Not on file Lifestyle  Physical activity Days per week: Not on file Minutes per session: Not on file  Stress: Not on file Relationships  Social connections Talks on phone: Not on file Gets together: Not on file Attends Jain service: Not on file Active member of club or organization: Not on file Attends meetings of clubs or organizations: Not on file Relationship status: Not on file  Intimate partner violence Fear of current or ex partner: Not on file Emotionally abused: Not on file Physically abused: Not on file Forced sexual activity: Not on file Other Topics Concern  Not on file Social History Narrative  Not on file Allergies: 
Patient has no known allergies. Medications: 
Current Facility-Administered Medications Medication Dose Route Frequency  amLODIPine (NORVASC) tablet 5 mg  5 mg Oral DAILY  0.45% sodium chloride with KCl 20 mEq/L infusion   IntraVENous CONTINUOUS  
 levoFLOXacin (LEVAQUIN) 500 mg in D5W IVPB  500 mg IntraVENous Q48H  piperacillin-tazobactam (ZOSYN) 3.375 g in 0.9% sodium chloride (MBP/ADV) 100 mL MBP EXTENDED 4 HOUR INFUSION###  3.375 g IntraVENous Q12H  
 enoxaparin (LOVENOX) injection 30 mg  30 mg SubCUTAneous Q24H  
 aspirin delayed-release tablet 81 mg  81 mg Oral DAILY  sodium chloride (NS) flush 5-40 mL  5-40 mL IntraVENous Q8H  
 sodium chloride (NS) flush 5-40 mL  5-40 mL IntraVENous PRN  
 acetaminophen (TYLENOL) tablet 650 mg  650 mg Oral Q6H PRN Or  
 acetaminophen (TYLENOL) suppository 650 mg  650 mg Rectal Q6H PRN  polyethylene glycol (MIRALAX) packet 17 g  17 g Oral DAILY PRN  promethazine (PHENERGAN) tablet 12.5 mg  12.5 mg Oral Q6H PRN Or  
 ondansetron (ZOFRAN) injection 4 mg  4 mg IntraVENous Q6H PRN  
  
 
ROS: 
A comprehensive review of systems was negative except for that written in the History of Present Illness. Physical Exam: HPI: 
Temp (24hrs), Av.4 °F (36.9 °C), Min:96.8 °F (36 °C), Max:99.4 °F (37.4 °C) Visit Vitals BP (!) 154/96 (BP 1 Location: Right arm, BP Patient Position: At rest) Pulse (!) 58  
 Temp 96.8 °F (36 °C) Resp 18 Ht 5' 9\" (1.753 m) Wt 84.8 kg (187 lb) SpO2 99% BMI 27.62 kg/m² General: Well developed, well nourished 63 y.o. BLACK OR  male in no acute distress. ENT: Throat normal without erythema or exudate Head: normocephalic, without obvious abnormality, atraumatic Mouth:  mucous membranes moist, pharynx normal without lesions Neck: supple, symmetrical, trachea midline Cardio:  regular rate and rhythm, S1, S2 normal, no murmur, click, rub or gallop Chest: inspection normal - no chest wall deformities or tenderness, respiratory effort normal, symmetric Lungs: clear to auscultation, no wheezes or rales and unlabored breathing Abdomen: Increased bowel sounds. Soft, non-tender. No distension or guarding. No masses, no organomegaly. Extremities:  no redness or tenderness in the calves or thighs, no edema Neuro: Alert and interactive, answers all questions appropriately; motor and sensory grossly intact. Lab results: 
 
Chemistry Recent Labs  
  01/18/21 
0415 01/17/21 
0341 01/16/21 
0435 GLU 82 74 75 * 147* 143  
K 3.8 3.6 3.5 * 111 105 CO2 25 28 33* BUN 50* 52* 54* CREA 4.33* 4.33* 4.48* CA 8.6 8.7 9.1 AGAP 11 8 5 BUCR 12 12 12 ALB 2.2* 2.0* 1.8* CBC w/ Diff Recent Labs  
  01/18/21 0415 WBC 11.0  
RBC 4.62* HGB 14.5 HCT 43.1  GRANS 70 LYMPH 18* EOS 0 Microbiology All Micro Results Procedure Component Value Units Date/Time CULTURE, BLOOD [[id number]] Collected: 01/17/21 0854 Order Status: Completed Specimen: Blood Updated: 01/18/21 0756 Special Requests: RIGHT ARM Culture result: NO GROWTH AFTER 22 HOURS     
 CULTURE, BLOOD [[id number]] Collected: 01/17/21 0854 Order Status: Completed Specimen: Blood Updated: 01/18/21 0756 Special Requests: LEFT ARM Culture result: NO GROWTH AFTER 22 HOURS     
 CULTURE, BLOOD [[id number]] Collected: 01/14/21 0015 Order Status: Completed Specimen: Blood Updated: 01/18/21 0756 Special Requests: NO SPECIAL REQUESTS Culture result: NO GROWTH 4 DAYS     
 CULTURE, BLOOD [[id number]]  (Abnormal) Collected: 01/14/21 0017 Order Status: Completed Specimen: Blood Updated: 01/17/21 9720 Special Requests: NO SPECIAL REQUESTS     
  GRAM STAIN GRAM NEGATIVE RODS PEDS SMEAR CALLED TO AND CORRECTLY REPEATED BY: [id number] AT 7336,78/56/36 TO ETT. Culture result:    
  GRAM NEGATIVE RODS GROWING IN THE PEDIATRIC BOTTLE (SITE = L. ARM) ENTERIC BACTERIA PANEL, DNA [[id number]]  (Abnormal) Collected: 01/14/21 1730 Order Status: Completed Specimen: Stool Updated: 01/16/21 1314 Shigella species, DNA Positive Campylobacter species, DNA Negative Vibrio species, DNA Negative Enterotoxigen E Coli, DNA Negative Shiga toxin producing, DNA Negative Salmonella species, DNA Negative P. shigelloides, DNA Negative Y. enterocolitica, DNA Negative CULTURE, URINE [[id number]] Collected: 01/13/21 2040 Order Status: Completed Specimen: Urine from Clean catch Updated: 01/15/21 1325 Special Requests: NO SPECIAL REQUESTS Culture result: No growth (<1,000 CFU/ML) [address] [employer] [[id number]] Collected: 01/14/21 1730 Order Status: Completed Specimen: Feces from Stool Updated: 01/14/21 1921 C. DIFFICILE AG & TOXIN A/B [[id number]] Collected: 01/13/21 2040 Order Status: Completed Specimen: Stool Updated: 01/14/21 2538 [address] Anthony Mccloud ANTIGEN Negative C. difficile toxin Negative INTERPRETATION    
  NEGATIVE FOR TOXIGENIC C. DIFFICILE Jennifer Hi 1/18/2021  
11:00 AM

## 2021-01-18 NOTE — PROGRESS NOTES
Problem: Discharge Planning  Goal: *Discharge to safe environment  Outcome: Progressing Towards Goal   Plan home      Plan home, cm to follow for needs.

## 2021-01-18 NOTE — PROGRESS NOTES
RENAL PROGRESS NOTE        Ute Jiang         Assessment/Plan:   · YOLIE due to volume depletion with possible progression to ischemic atn/rhabdomyolysis. Use of ACE I prior to admission contributed to [facility]. Tenofovir also has been associated with yolie. Nonoliguric, scr is the same as yesterday, continue ivf, agree with changing to 1/2 NS with KCL given mild hypernatremia. Would prefer to see improvement in renal function before d/c.   · Hypokalemia. Replaced. · Rhabdomyolysis, improving. As far as etio- suspect volume depletion/statin/and possibly art. · HTN. BP is rebounding, add amlodipine. Continue to hold lisinopril. · HIV. ART is on hold due to severe yolie/low gfr. · Acute gastroenteritis, mgmt is deferred to primary team. Improving. · Episode of fever, on abx. Subjective:  Patient complaints off:  Feels better, diarrhea is improving. No SOB/CP/N/V. Tolerates diet.        Patient Active Problem List   Diagnosis Code    Rhabdomyolysis M62.82    [facility]IE [facility]acute [facility]) ([facility] N17.9    HIV (human immunodeficiency virus infection) ([facility] B20    Diarrhea R19.7    Hypokalemia E87.6    Shigella gastroenteritis A03.9    Gram-negative bacteremia R78.81       Current Facility-Administered Medications   Medication Dose Route Frequency Provider Last Rate Last Admin    0.45% sodium chloride with KCl 20 mEq/L infusion   IntraVENous CONTINUOUS Yan Farley  mL/hr at 01/18/21 0919 New Bag at 01/18/21 0919    levoFLOXacin (LEVAQUIN) 500 mg in D5W IVPB  500 mg IntraVENous Q48H Chacha Farley  mL/hr at 01/18/21 0403 500 mg at 01/18/21 0403    piperacillin-tazobactam (ZOSYN) 3.375 g in 0.9% sodium chloride (MBP/ADV) 100 mL MBP EXTENDED 4 HOUR INFUSION###  3.375 g IntraVENous Q12H Arley ALVAREZ DO 25 mL/hr at 01/17/21 2324 3.375 g at 01/17/21 2329  enoxaparin (LOVENOX) injection 30 mg  30 mg SubCUTAneous Q24H Liliana Moreno MD   30 mg at 01/18/21 3483    aspirin delayed-release tablet 81 mg  81 mg Oral DAILY Serafin Dent MD   81 mg at 01/18/21 0914    sodium chloride (NS) flush 5-40 mL  5-40 mL IntraVENous Q8H Serafin Dent MD   10 mL at 01/18/21 0511    sodium chloride (NS) flush 5-40 mL  5-40 mL IntraVENous PRN Serafin Dent MD        acetaminophen (TYLENOL) tablet 650 mg  650 mg Oral Q6H PRN Serafin Dent MD   650 mg at 01/13/21 2312    Or    acetaminophen (TYLENOL) suppository 650 mg  650 mg Rectal Q6H PRN Serafin Dent MD        polyethylene glycol (MIRALAX) packet 17 g  17 g Oral DAILY PRN Serafin Dent MD        promethazine (PHENERGAN) tablet 12.5 mg  12.5 mg Oral Q6H PRN Serafin Dent MD        Or    ondansetron [facility]) injection 4 mg  4 mg IntraVENous Q6H PRN Serafin Dent MD           Objective  Vitals:    01/17/21 2000 01/18/21 0000 01/18/21 0400 01/18/21 0735   BP: (!) 143/79 (!) 154/77 (!) 159/80 (!) 154/96   Pulse: 72 78 83 (!) 58   Resp: 18 18 18 18   Temp: 99.4 °F (37.4 °C) 98.8 °F (37.1 °C) 98.5 °F (36.9 °C) 96.8 °F (36 °C)   SpO2: 97% 97% 95% 99%   Weight:       Height:             Intake/Output Summary (Last 24 hours) at 1/18/2021 1031  Last data filed at 1/18/2021 0914  Gross per 24 hour   Intake 1356.67 ml   Output 1875 ml   Net -518.33 ml           Admission weight: Weight: 79.2 kg (174 lb 11.2 oz) (01/14/21 0940)  Last Weight Metrics:  Weight Loss Metrics 1/17/2021 10/11/2020 4/9/2019 11/17/2016   Today's Wt 187 lb 171 lb 176 lb 164 lb   BMI 27.62 kg/m2 25.25 kg/m2 - -             Physical Assessment:     General: NAD, alert and oriented. Neck: No jvd. LUNGS: Clear to Auscultation, No rales, rhonchi or wheezes. CVS EXM: S1, S2  RRR, no murmurs/gallops/rubs. Abdomen: soft, non tender. Lower Extremities:  no edema.        Lab    CBC w/Diff Recent Labs     01/18/21  0415   WBC 11.0   RBC 4.62*   HGB 14.5   HCT 43.1      GRANS 70   LYMPH 18*   EOS 0        Chemistry Recent Labs     01/18/21  0415 01/17/21  0341 01/16/21  0435   GLU 82 74 75   * 147* 143   K 3.8 3.6 3.5   * 111 105   CO2 25 28 33*   BUN 50* 52* 54*   CREA 4.33* 4.33* 4.48*   CA 8.6 8.7 9.1   AGAP 11 8 5   BUCR 12 12 12   ALB 2.2* 2.0* 1.8*   PHOS 5.1* 3.7 1.9*         No results found for: IRON, FE, TIBC, IBCT, PSAT, FERR   Lab Results   Component Value Date/Time    Calcium 8.6 01/18/2021 04:15 AM    Phosphorus 5.1 (H) 01/18/2021 04:15 AM        Alexia Cabrera M.D.   [facility]  Phone

## 2021-01-18 NOTE — ADVANCED PRACTICE NURSE
Bedside shift change report given to ABHIJIT Angela (oncoming nurse) by Jez Moreno RN (offgoing nurse). Report included the following information SBAR, Kardex, Intake/Output, MAR and Recent Results. 7142 -- A/O x4, no pain or SOB, patient sitting bedside, needs met, call bell and personal belongings in reach.      0914 -- AM medications given, well tolerated, will continue to monitor. 1242 -- Zosyn infusing, Norvasc unavailable at this time per Pyxis reboot    1250 -- Critical lab from [facility], blood culture from 1/14/21 resulted Shigella in the left arm bottle, Dr. Rosa Books aware no further instructions. 1306 -- Med given. Bedside shift change report given to Juan Carlos Ceballos RN (oncoming nurse) by Daun Ormond, RN (offgoing nurse). Report included the following information SBAR, Kardex, Intake/Output, MAR and Recent Results.

## 2021-01-18 NOTE — PROGRESS NOTES
Internal Medicine Progress Note    Patient's Name: Mercedes Barrientos  Admit Date: 1/13/2021  Length of Stay: 5      Assessment/Plan     C/Fernando Sharma 1106 Problems    Diagnosis Date Noted    Gram-negative bacteremia 01/17/2021    Shigella gastroenteritis 01/16/2021    Hypokalemia 01/15/2021    YOLIE (acute kidney injury) ([facility] [address] 01/14/2021    HIV (human immunodeficiency virus infection) ([facility] [address] 01/14/2021    Diarrhea 01/14/2021    Rhabdomyolysis 01/13/2021       Rhabdo/YOLIE  - continue IV fluids - switch to 1/2NS +K because Na trending up  - monitor BMP - Cr down slightly   - CK trending down as of 01/17  - Stable 01/18  - appreciate nephrology     Shigella gastroenteritis, bacteremia  - cont IV levaquin  - ID consult - appreciate assistance  - 1/2 BCx resulted 1/15 with shigella as well  - repeat BCx 1/17 NGTD     HIV  - d/c genvoya due to YOLIE     Hypokalemia  - 2/2 diarrhea  - replace and recheck    - Cont acceptable home medications for chronic conditions   - DVT protocol    I have personally reviewed all pertinent labs and films that have officially resulted over the last 24 hours. I have personally checked for all pending labs that are awaiting final results.     Subjective     Pt s/e @ bedside  No major events overnight  Doing ok  Stool more formed  C/o L hip pain, but sound chronic in nature  Denies CP or SOB    Objective     Visit Vitals  BP (!) 147/78 (BP 1 Location: Right arm, BP Patient Position: At rest)   Pulse 62   Temp 98 °F (36.7 °C)   Resp 18   Ht 5' 9\" (1.753 m)   Wt 84.8 kg (187 lb)   SpO2 97%   BMI 27.62 kg/m²       Physical Exam:  General Appearance: NAD, conversant  Lungs: CTA with normal respiratory effort  CV: RRR, no m/r/g  Abdomen: soft, non-tender, normal bowel sounds  Extremities: no cyanosis, no peripheral edema  Neuro: No focal deficits, motor/sensory intact    Lab/Data Reviewed:  BMP:   Lab Results   Component Value Date/Time     (H) 01/18/2021 04:15 AM    K 3.8 01/18/2021 04:15 AM  (H) 01/18/2021 04:15 AM    CO2 25 01/18/2021 04:15 AM    AGAP 11 01/18/2021 04:15 AM    GLU 82 01/18/2021 04:15 AM    BUN 50 (H) 01/18/2021 04:15 AM    CREA 4.33 (H) 01/18/2021 04:15 AM    GFRAA 17 (L) 01/18/2021 04:15 AM    GFRNA 14 (L) 01/18/2021 04:15 AM     CBC:   Lab Results   Component Value Date/Time    WBC 11.0 01/18/2021 04:15 AM    HGB 14.5 01/18/2021 04:15 AM    HCT 43.1 01/18/2021 04:15 AM     01/18/2021 04:15 AM       Imaging Reviewed:  No results found.     Medications Reviewed:  Current Facility-Administered Medications   Medication Dose Route Frequency    amLODIPine (NORVASC) tablet 5 mg  5 mg Oral DAILY    0.45% sodium chloride with KCl 20 mEq/L infusion   IntraVENous CONTINUOUS    levoFLOXacin (LEVAQUIN) 500 mg in D5W IVPB  500 mg IntraVENous Q48H    piperacillin-tazobactam (ZOSYN) 3.375 g in 0.9% sodium chloride (MBP/ADV) 100 mL MBP EXTENDED 4 HOUR INFUSION###  3.375 g IntraVENous Q12H    enoxaparin (LOVENOX) injection 30 mg  30 mg SubCUTAneous Q24H    aspirin delayed-release tablet 81 mg  81 mg Oral DAILY    sodium chloride (NS) flush 5-40 mL  5-40 mL IntraVENous Q8H    sodium chloride (NS) flush 5-40 mL  5-40 mL IntraVENous PRN    acetaminophen (TYLENOL) tablet 650 mg  650 mg Oral Q6H PRN    Or    acetaminophen (TYLENOL) suppository 650 mg  650 mg Rectal Q6H PRN    polyethylene glycol (MIRALAX) packet 17 g  17 g Oral DAILY PRN    promethazine (PHENERGAN) tablet 12.5 mg  12.5 mg Oral Q6H PRN    Or    ondansetron (ZOFRAN) injection 4 mg  4 mg IntraVENous Q6H PRN           Mikaela Wu DO  Internal Medicine, Hospitalist  Pager: [phone]  [address] [place] [address]

## 2021-01-19 LAB
ALBUMIN SERPL-MCNC: 2.2 G/DL (ref 3.4–5)
ANION GAP SERPL CALC-SCNC: 12 MMOL/L (ref 3–18)
BUN SERPL-MCNC: 49 MG/DL (ref 7–18)
BUN/CREAT SERPL: 11 (ref 12–20)
CALCIUM SERPL-MCNC: 8.4 MG/DL (ref 8.5–10.1)
CHLORIDE SERPL-SCNC: 112 MMOL/L (ref 100–111)
CO2 SERPL-SCNC: 24 MMOL/L (ref 21–32)
CREAT SERPL-MCNC: 4.35 MG/DL (ref 0.6–1.3)
GLUCOSE SERPL-MCNC: 77 MG/DL (ref 74–99)
O+P SPEC MICRO: NORMAL
O+P STL CONC: NORMAL
PHOSPHATE SERPL-MCNC: 4.9 MG/DL (ref 2.5–4.9)
POTASSIUM SERPL-SCNC: 3.9 MMOL/L (ref 3.5–5.5)
SODIUM SERPL-SCNC: 148 MMOL/L (ref 136–145)
SPECIMEN SOURCE: NORMAL

## 2021-01-19 PROCEDURE — 65270000029 HC RM PRIVATE

## 2021-01-19 PROCEDURE — 74011250637 HC RX REV CODE- 250/637: Performed by: INTERNAL MEDICINE

## 2021-01-19 PROCEDURE — 74011250636 HC RX REV CODE- 250/636: Performed by: PHYSICIAN ASSISTANT

## 2021-01-19 PROCEDURE — 36415 COLL VENOUS BLD VENIPUNCTURE: CPT

## 2021-01-19 PROCEDURE — 74011250636 HC RX REV CODE- 250/636: Performed by: HOSPITALIST

## 2021-01-19 PROCEDURE — 80069 RENAL FUNCTION PANEL: CPT

## 2021-01-19 RX ORDER — NALOXONE HYDROCHLORIDE 0.4 MG/ML
0.4 INJECTION, SOLUTION INTRAMUSCULAR; INTRAVENOUS; SUBCUTANEOUS AS NEEDED
Status: DISCONTINUED | OUTPATIENT
Start: 2021-01-19 | End: 2021-01-20 | Stop reason: HOSPADM

## 2021-01-19 RX ORDER — TRAMADOL HYDROCHLORIDE 50 MG/1
50 TABLET ORAL
Status: DISCONTINUED | OUTPATIENT
Start: 2021-01-19 | End: 2021-01-20 | Stop reason: HOSPADM

## 2021-01-19 RX ORDER — LEVOFLOXACIN 250 MG/1
500 TABLET ORAL
Status: DISCONTINUED | OUTPATIENT
Start: 2021-01-20 | End: 2021-01-20 | Stop reason: HOSPADM

## 2021-01-19 RX ADMIN — AMLODIPINE BESYLATE 5 MG: 5 TABLET ORAL at 08:30

## 2021-01-19 RX ADMIN — ASPIRIN 81 MG: 81 TABLET, COATED ORAL at 08:30

## 2021-01-19 RX ADMIN — SODIUM CHLORIDE AND POTASSIUM CHLORIDE: 4.5; 1.49 INJECTION, SOLUTION INTRAVENOUS at 12:43

## 2021-01-19 RX ADMIN — Medication 10 ML: at 22:00

## 2021-01-19 RX ADMIN — Medication 10 ML: at 06:07

## 2021-01-19 RX ADMIN — ENOXAPARIN SODIUM 30 MG: 30 INJECTION SUBCUTANEOUS at 08:30

## 2021-01-19 NOTE — PROGRESS NOTES
RENAL PROGRESS NOTE        Antonio Heading         Assessment/Plan:   · YOLIE due to volume depletion with possible progression to ischemic atn/rhabdomyolysis. Use of ACE I prior to admission [facility] Tenofovir also has been associated with yolie. Nonoliguric, scr is essentially the same as yesterday, continue ivf. Would prefer to see improvement in renal function before d/c.   · Hypokalemia. Replaced. · Rhabdomyolysis, improving. As far as etio- suspect volume depletion/statin/and possibly art. · HTN. BP is rebounding, amlodipine added yesterday. Continue to hold lisinopril. · HIV. ART is on hold due to severe yolie/low gfr. · Acute gastroenteritis with shigella bacteriemia (not stec) . Abx per ID. Subjective:  Patient complaints off:  Feels better, diarrhea is improving. No SOB/CP/N/V. Tolerates diet. Hasn't ambulated due to shooting pain at the bottom of both feet.        Patient Active Problem List   Diagnosis Code    Rhabdomyolysis M62.82    YOLIE (acute kidney injury) ([facility] [address] N17.9    HIV (human immunodeficiency virus infection) ([facility] [address] B20    Diarrhea R19.7    Hypokalemia E87.6    Shigella gastroenteritis A03.9    Gram-negative bacteremia R78.81       Current Facility-Administered Medications   Medication Dose Route Frequency Provider Last Rate Last Admin    [START ON 1/20/2021] levoFLOXacin (LEVAQUIN) tablet 500 mg  500 mg Oral Q48H Paulina Neither H, DO        traMADoL Leatha Munoz) tablet 50 mg  50 mg Oral Q6H PRN Luis Alfredo Push, DO        naloxone [facility]) injection 0.4 mg  0.4 mg IntraVENous PRN Paulina Neither H, DO        amLODIPine (NORVASC) tablet 5 mg  5 mg Oral DAILY Aure Odell MD   5 mg at 01/19/21 0830    0.45% sodium chloride with KCl 20 mEq/L infusion   IntraVENous CONTINUOUS Reprogle, LUIS Reynoso 100 mL/hr at 01/19/21 1243 New Bag at 01/19/21 1243    enoxaparin (LOVENOX) injection 30 mg  30 mg SubCUTAneous Q24H Milena Gorman MD   30 mg at 01/19/21 0830    aspirin delayed-release tablet 81 mg  81 mg Oral DAILY Wallis Hashimoto, MD   81 mg at 01/19/21 0830    sodium chloride (NS) flush 5-40 mL  5-40 mL IntraVENous Q8H Wallis Hashimoto, MD   Stopped at 01/19/21 1400    sodium chloride (NS) flush 5-40 mL  5-40 mL IntraVENous PRN Wallis Hashimoto, MD        acetaminophen (TYLENOL) tablet 650 mg  650 mg Oral Q6H PRN Wallis Hashimoto, MD   650 mg at 01/13/21 2312    Or    acetaminophen (TYLENOL) suppository 650 mg  650 mg Rectal Q6H PRN Wallis Hashimoto, MD        polyethylene glycol (MIRALAX) packet 17 g  17 g Oral DAILY PRN Wallis Hashimoto, MD        promethazine (PHENERGAN) tablet 12.5 mg  12.5 mg Oral Q6H PRN Wallis Hashimoto, MD        Or    ondansetron [facility]) injection 4 mg  4 mg IntraVENous Q6H PRN Wallis Hashimoto, MD           Objective  Vitals:    01/18/21 2322 01/19/21 0400 01/19/21 1104 01/19/21 1447   BP: 129/74 133/89 (!) 158/89 (!) 158/86   Pulse: 73 66 68 77   Resp: 16 18 20 20   Temp: 98.6 °F (37 °C) 97.9 °F (36.6 °C) 98.1 °F (36.7 °C) 98.5 °F (36.9 °C)   SpO2: 96% 97% 96% 93%   Weight:       Height:             Intake/Output Summary (Last 24 hours) at 1/19/2021 1535  Last data filed at 1/19/2021 0950  Gross per 24 hour   Intake 3131.66 ml   Output 2026 ml   Net 1105.66 ml           Admission weight: Weight: 79.2 kg (174 lb 11.2 oz) (01/14/21 0940)  Last Weight Metrics:  Weight Loss Metrics 1/17/2021 10/11/2020 4/9/2019 11/17/2016   Today's Wt 187 lb 171 lb 176 lb 164 lb   BMI 27.62 kg/m2 25.25 kg/m2 - -             Physical Assessment:     General: NAD, alert and oriented. Neck: No jvd. LUNGS: Clear to Auscultation, No rales, rhonchi or wheezes. CVS EXM: S1, S2  RRR, no murmurs/gallops/rubs. Abdomen: soft, non tender. Lower Extremities:  no edema.        Lab    CBC w/Diff Recent Labs     01/18/21  0415   WBC 11.0   RBC 4.62* HGB 14.5   HCT 43.1      GRANS 70   LYMPH 18*   EOS 0        Chemistry Recent Labs     01/19/21  0425 01/18/21  0415 01/17/21  0341   GLU 77 82 74   * 149* 147*   K 3.9 3.8 3.6   * 113* 111   CO2 24 25 28   BUN 49* 50* 52*   CREA 4.35* 4.33* 4.33*   CA 8.4* 8.6 8.7   AGAP 12 11 8   BUCR 11* 12 12   ALB 2.2* 2.2* 2.0*   PHOS 4.9 5.1* 3.7         No results found for: IRON, FE, TIBC, IBCT, PSAT, FERR   Lab Results   Component Value Date/Time    Calcium 8.4 (L) 01/19/2021 04:25 AM    Phosphorus 4.9 01/19/2021 04:25 AM        Connor Morfin M.D.   [facility]  Phone

## 2021-01-19 NOTE — PROGRESS NOTES
Problem: Risk for Spread of Infection  Goal: Prevent transmission of infectious organism to others  Description: Prevent the transmission of infectious organisms to other patients, staff members, and visitors. Outcome: Progressing Towards Goal     Problem: Patient Education:  Go to Education Activity  Goal: Patient/Family Education  Outcome: Progressing Towards Goal     Problem: Discharge Planning  Goal: *Discharge to safe environment  Outcome: Progressing Towards Goal     Problem: Pain  Goal: *Control of Pain  Outcome: Progressing Towards Goal     Problem: Patient Education: Go to Patient Education Activity  Goal: Patient/Family Education  Outcome: Progressing Towards Goal     Problem: Falls - Risk of  Goal: *Absence of Falls  Description: Document Gray English Fall Risk and appropriate interventions in the flowsheet.   Outcome: Progressing Towards Goal  Note: Fall Risk Interventions:            Medication Interventions: Teach patient to arise slowly                   Problem: Patient Education: Go to Patient Education Activity  Goal: Patient/Family Education  Outcome: Progressing Towards Goal

## 2021-01-19 NOTE — PROGRESS NOTES
Bedside shift change report given to Fredy Patel RN (oncoming nurse) by Lynnette Olson RN (offgoing nurse). Report included the following information SBAR, Kardex and Recent Results. 0730 Confirmed with Dr. Jayden Brown that patient should be on contact precautions. 0825 Confirmed with Blas Nava that patient should be on enteric precautions. Will initiate. 2919 Patient complains that he is having shooting pains up the right leg and shooting pains in his left hip.     0130 Noted that patient was not connected to fluids. Unclear when patient was disconnected. Patients states that it was stopped around 0400/0500 this morning. \"it was beeping beeping beeping and then it said complete. \"    Bedside shift change report given to Madie Heimlich (oncoming nurse) by Fredy Patel RN (offgoing nurse). Report included the following information SBAR, Kardex, MAR and Recent Results.

## 2021-01-19 NOTE — PROGRESS NOTES
Problem: Risk for Spread of Infection  Goal: Prevent transmission of infectious organism to others  Description: Prevent the transmission of infectious organisms to other patients, staff members, and visitors. Outcome: Progressing Towards Goal     Problem: Patient Education:  Go to Education Activity  Goal: Patient/Family Education  Outcome: Progressing Towards Goal     Problem: Discharge Planning  Goal: *Discharge to safe environment  Outcome: Progressing Towards Goal     Problem: Pain  Goal: *Control of Pain  Outcome: Progressing Towards Goal     Problem: Falls - Risk of  Goal: *Absence of Falls  Description: Document Robinson Cease Fall Risk and appropriate interventions in the flowsheet.   Outcome: Progressing Towards Goal  Note: Fall Risk Interventions:            Medication Interventions: Patient to call before getting OOB

## 2021-01-19 NOTE — PROGRESS NOTES
Internal Medicine Progress Note    Patient's Name: Marta Hines  Admit Date: 1/13/2021  Length of Stay: 6      Assessment/Plan     C/Fernando Sharma 1106 Problems    Diagnosis Date Noted    Gram-negative bacteremia 01/17/2021    Shigella gastroenteritis 01/16/2021    Hypokalemia 01/15/2021    YOLIE (acute kidney injury) ([facility] [address] 01/14/2021    HIV (human immunodeficiency virus infection) ([facility] [address] 01/14/2021    Diarrhea 01/14/2021    Rhabdomyolysis 01/13/2021       Rhabdo/YOLIE  - continue IV fluids - switch to 1/2NS +K because Na trending up  - monitor BMP - Cr down slightly   - CK trending down as of 01/17 and back to WNL  - Cr holding steady around 4.3  - appreciate nephrology     Shigella gastroenteritis, bacteremia  - cont IV levaquin  - ID consult - appreciate assistance  - 1/2 BCx resulted 1/15 with shigella as well  - repeat BCx 1/17 NGTD  - ID recommended levaquin PO until 01/28     HIV  - d/c genvoya due to YOLIE     Hypokalemia  - 2/2 diarrhea  - replace and recheck PRN    - Cont acceptable home medications for chronic conditions   - DVT protocol    I have personally reviewed all pertinent labs and films that have officially resulted over the last 24 hours. I have personally checked for all pending labs that are awaiting final results.     Subjective     Pt s/e @ bedside  No major events overnight  C/o right foot pain and L hip  Otherwise, stool formed  Denies CP or SOB    Objective     Visit Vitals  BP (!) 158/89   Pulse 68   Temp 98.1 °F (36.7 °C)   Resp 20   Ht 5' 9\" (1.753 m)   Wt 84.8 kg (187 lb)   SpO2 96%   BMI 27.62 kg/m²       Physical Exam:  General Appearance: NAD, conversant  Lungs: CTA with normal respiratory effort  CV: RRR, no m/r/g  Abdomen: soft, non-tender, normal bowel sounds  Extremities: no cyanosis, no peripheral edema, L foot without abn  Neuro: No focal deficits, motor/sensory intact    Lab/Data Reviewed:  BMP:   Lab Results   Component Value Date/Time     (H) 01/19/2021 04:25 AM    K 3.9 01/19/2021 04:25 AM     (H) 01/19/2021 04:25 AM    CO2 24 01/19/2021 04:25 AM    AGAP 12 01/19/2021 04:25 AM    GLU 77 01/19/2021 04:25 AM    BUN 49 (H) 01/19/2021 04:25 AM    CREA 4.35 (H) 01/19/2021 04:25 AM    GFRAA 17 (L) 01/19/2021 04:25 AM    GFRNA 14 (L) 01/19/2021 04:25 AM     CBC:   No results found for: WBC, HGB, HGBEXT, HCT, HCTEXT, PLT, PLTEXT, HGBEXT, HCTEXT, PLTEXT    Imaging Reviewed:  No results found.     Medications Reviewed:  Current Facility-Administered Medications   Medication Dose Route Frequency    amLODIPine (NORVASC) tablet 5 mg  5 mg Oral DAILY    0.45% sodium chloride with KCl 20 mEq/L infusion   IntraVENous CONTINUOUS    levoFLOXacin (LEVAQUIN) 500 mg in D5W IVPB  500 mg IntraVENous Q48H    enoxaparin (LOVENOX) injection 30 mg  30 mg SubCUTAneous Q24H    aspirin delayed-release tablet 81 mg  81 mg Oral DAILY    sodium chloride (NS) flush 5-40 mL  5-40 mL IntraVENous Q8H    sodium chloride (NS) flush 5-40 mL  5-40 mL IntraVENous PRN    acetaminophen (TYLENOL) tablet 650 mg  650 mg Oral Q6H PRN    Or    acetaminophen (TYLENOL) suppository 650 mg  650 mg Rectal Q6H PRN    polyethylene glycol (MIRALAX) packet 17 g  17 g Oral DAILY PRN    promethazine (PHENERGAN) tablet 12.5 mg  12.5 mg Oral Q6H PRN    Or    ondansetron (ZOFRAN) injection 4 mg  4 mg IntraVENous Q6H PRN           Randolph Pike DO  Internal Medicine, Hospitalist  Pager: [phone]  [address] [place] [address]

## 2021-01-19 NOTE — ROUTINE PROCESS
Bedside and Verbal shift change report given to Renita Lozoya RN, BSN (oncoming nurse) by Deirdre Dumont RN (offgoing nurse). Report included the following information SBAR, Kardex, ED Summary, Intake/Output, MAR and Recent Results.   
 
Renita Lozoya RN, BSN

## 2021-01-19 NOTE — PROGRESS NOTES
1900  -- Bedside, Verbal and Written shift change report given to [address] (oncoming nurse) by [facility] nurse). Report included the following information SBAR, Kardex, Intake/Output, MAR and Recent Results.       3226 -- PM medications administered, pt tolerated with ease, will continue to monitor.       -- Bedside, Verbal and Written shift change report given to   (oncoming nurse) by ABIGAIL (offgoing nurse). Report included the following information SBAR, Kardex, Intake/Output, MAR and Recent Results. Skin assessment completed.

## 2021-01-20 VITALS
BODY MASS INDEX: 27.7 KG/M2 | SYSTOLIC BLOOD PRESSURE: 132 MMHG | HEART RATE: 66 BPM | HEIGHT: 69 IN | TEMPERATURE: 98 F | WEIGHT: 187 LBS | RESPIRATION RATE: 18 BRPM | OXYGEN SATURATION: 99 % | DIASTOLIC BLOOD PRESSURE: 83 MMHG

## 2021-01-20 LAB
ALBUMIN SERPL-MCNC: 2 G/DL (ref 3.4–5)
ANION GAP SERPL CALC-SCNC: 10 MMOL/L (ref 3–18)
BACTERIA SPEC CULT: NORMAL
BUN SERPL-MCNC: 43 MG/DL (ref 7–18)
BUN/CREAT SERPL: 10 (ref 12–20)
CALCIUM SERPL-MCNC: 7.9 MG/DL (ref 8.5–10.1)
CHLORIDE SERPL-SCNC: 113 MMOL/L (ref 100–111)
CO2 SERPL-SCNC: 23 MMOL/L (ref 21–32)
CREAT SERPL-MCNC: 4.16 MG/DL (ref 0.6–1.3)
GLUCOSE SERPL-MCNC: 88 MG/DL (ref 74–99)
PHOSPHATE SERPL-MCNC: 4.2 MG/DL (ref 2.5–4.9)
POTASSIUM SERPL-SCNC: 3.6 MMOL/L (ref 3.5–5.5)
SERVICE CMNT-IMP: NORMAL
SODIUM SERPL-SCNC: 146 MMOL/L (ref 136–145)

## 2021-01-20 PROCEDURE — 74011250637 HC RX REV CODE- 250/637: Performed by: HOSPITALIST

## 2021-01-20 PROCEDURE — 74011250636 HC RX REV CODE- 250/636: Performed by: PHYSICIAN ASSISTANT

## 2021-01-20 PROCEDURE — 97161 PT EVAL LOW COMPLEX 20 MIN: CPT

## 2021-01-20 PROCEDURE — 74011250637 HC RX REV CODE- 250/637: Performed by: INTERNAL MEDICINE

## 2021-01-20 PROCEDURE — 80069 RENAL FUNCTION PANEL: CPT

## 2021-01-20 PROCEDURE — 2709999900 HC NON-CHARGEABLE SUPPLY

## 2021-01-20 PROCEDURE — 36415 COLL VENOUS BLD VENIPUNCTURE: CPT

## 2021-01-20 PROCEDURE — 74011250636 HC RX REV CODE- 250/636: Performed by: HOSPITALIST

## 2021-01-20 RX ORDER — AMLODIPINE BESYLATE 10 MG/1
10 TABLET ORAL DAILY
Qty: 30 TAB | Refills: 0 | Status: SHIPPED | OUTPATIENT
Start: 2021-01-21 | End: 2021-03-24

## 2021-01-20 RX ORDER — LEVOFLOXACIN 500 MG/1
500 TABLET, FILM COATED ORAL
Qty: 3 TAB | Refills: 0 | Status: SHIPPED | OUTPATIENT
Start: 2021-01-22 | End: 2021-01-28

## 2021-01-20 RX ORDER — TRAMADOL HYDROCHLORIDE 50 MG/1
50 TABLET ORAL
Qty: 12 TAB | Refills: 0 | Status: SHIPPED | OUTPATIENT
Start: 2021-01-20 | End: 2021-01-23

## 2021-01-20 RX ORDER — AMLODIPINE BESYLATE 10 MG/1
10 TABLET ORAL DAILY
Status: DISCONTINUED | OUTPATIENT
Start: 2021-01-21 | End: 2021-01-20 | Stop reason: HOSPADM

## 2021-01-20 RX ADMIN — LEVOFLOXACIN 500 MG: 250 TABLET, FILM COATED ORAL at 04:35

## 2021-01-20 RX ADMIN — SODIUM CHLORIDE AND POTASSIUM CHLORIDE: 4.5; 1.49 INJECTION, SOLUTION INTRAVENOUS at 01:09

## 2021-01-20 RX ADMIN — Medication 10 ML: at 06:00

## 2021-01-20 RX ADMIN — ENOXAPARIN SODIUM 30 MG: 30 INJECTION SUBCUTANEOUS at 09:12

## 2021-01-20 RX ADMIN — AMLODIPINE BESYLATE 5 MG: 5 TABLET ORAL at 09:12

## 2021-01-20 RX ADMIN — ASPIRIN 81 MG: 81 TABLET, COATED ORAL at 09:12

## 2021-01-20 NOTE — PROGRESS NOTES
RENAL PROGRESS NOTE        Tobi Lopez         Assessment/Plan:   · YOLIE due to ischemic atn/rhabdomyolysis. Use of ACE I prior to admission contributed to [facility]. Tenofovir also has been associated with yolie. Nonoliguric, scr is finally improvig. D/c ivf given improved oral intake. · Rhabdomyolysis, improving. As far as etio- suspect volume depletion/statin/and possibly art. · HTN. Increase amlodipine. Continue to hold lisinopril. · HIV. ART is on hold due to severe yolie/low gfr. · Acute gastroenteritis with shigella bacteriemia (not stec) . Abx per ID. Subjective:  Patient complaints off:  Feels better, diarrhea is improving. No SOB/CP/N/V. Tolerates diet. Hasn't ambulated due to shooting pain at the bottom of both feet- c/o about it again today.         Patient Active Problem List   Diagnosis Code    Rhabdomyolysis M62.82    YOLIE (acute kidney injury) ([facility] [address] N17.9    HIV (human immunodeficiency virus infection) ([facility] [address] B20    Diarrhea R19.7    Hypokalemia E87.6    Shigella gastroenteritis A03.9    Gram-negative bacteremia R78.81       Current Facility-Administered Medications   Medication Dose Route Frequency Provider Last Rate Last Admin    levoFLOXacin (LEVAQUIN) tablet 500 mg  500 mg Oral Q48H Waldo ALVAREZ, DO   500 mg at 01/20/21 0435    traMADoL (ULTRAM) tablet 50 mg  50 mg Oral Q6H PRN Cherelle Snyder,         naloxone [facility]) injection 0.4 mg  0.4 mg IntraVENous PRN Waldo Spence H, DO        amLODIPine (NORVASC) tablet 5 mg  5 mg Oral DAILY Mirovski, Lennox Bourbon, MD   5 mg at 01/20/21 0912    0.45% sodium chloride with KCl 20 mEq/L infusion   IntraVENous CONTINUOUS Reprogle, LUIS Garcia 100 mL/hr at 01/20/21 0109 New Bag at 01/20/21 0109    enoxaparin (LOVENOX) injection 30 mg  30 mg SubCUTAneous Q24H Bill Abernathy MD   30 mg at 01/20/21 9183    aspirin delayed-release tablet 81 mg  81 mg Oral DAILY Julien Hurtado MD   81 mg at 01/20/21 0912    sodium chloride (NS) flush 5-40 mL  5-40 mL IntraVENous Q8H Julien Hurtado MD   10 mL at 01/20/21 0600    sodium chloride (NS) flush 5-40 mL  5-40 mL IntraVENous PRN Julien Hurtado MD        acetaminophen (TYLENOL) tablet 650 mg  650 mg Oral Q6H PRN Julien Hurtado MD   650 mg at 01/13/21 2312    Or    acetaminophen (TYLENOL) suppository 650 mg  650 mg Rectal Q6H PRN Julien Hurtado MD        polyethylene glycol (MIRALAX) packet 17 g  17 g Oral DAILY PRN Julien Hurtado MD        promethazine (PHENERGAN) tablet 12.5 mg  12.5 mg Oral Q6H PRN Julien Hurtado MD        Or    ondansetron [facility]) injection 4 mg  4 mg IntraVENous Q6H PRN Julien Hurtado MD           Objective  Vitals:    01/19/21 2014 01/20/21 0045 01/20/21 0503 01/20/21 0720   BP: (!) 142/90 (!) 148/86 (!) 153/98 (!) 135/92   Pulse: 71 73 68 67   Resp: 20 20 18 18   Temp: 98.6 °F (37 °C) 99.3 °F (37.4 °C) 98.2 °F (36.8 °C) 98.5 °F (36.9 °C)   SpO2: 97% 94% 99% 97%   Weight:       Height:             Intake/Output Summary (Last 24 hours) at 1/20/2021 1048  Last data filed at 1/20/2021 0915  Gross per 24 hour   Intake    Output 1200 ml   Net -1200 ml           Admission weight: Weight: 79.2 kg (174 lb 11.2 oz) (01/14/21 0940)  Last Weight Metrics:  Weight Loss Metrics 1/17/2021 10/11/2020 4/9/2019 11/17/2016   Today's Wt 187 lb 171 lb 176 lb 164 lb   BMI 27.62 kg/m2 25.25 kg/m2 - -             Physical Assessment:     General: NAD, alert and oriented. Neck: No jvd. LUNGS: Clear to Auscultation, No rales, rhonchi or wheezes. CVS EXM: S1, S2  RRR, no murmurs/gallops/rubs. Abdomen: soft, non tender. Lower Extremities:  no edema.        Lab    CBC w/Diff Recent Labs     01/18/21  0415   WBC 11.0   RBC 4.62*   HGB 14.5   HCT 43.1      GRANS 70   LYMPH 18*   EOS 0        Chemistry Recent Labs     01/20/21  0356 01/19/21  0425 01/18/21  0415   GLU 88 77 82   * 148* 149*   K 3.6 3.9 3.8   * 112* 113*   CO2 23 24 25   BUN 43* 49* 50*   CREA 4.16* 4.35* 4.33*   CA 7.9* 8.4* 8.6   AGAP 10 12 11   BUCR 10* 11* 12   ALB 2.0* 2.2* 2.2*   PHOS 4.2 4.9 5.1*         No results found for: IRON, FE, TIBC, IBCT, PSAT, FERR   Lab Results   Component Value Date/Time    Calcium 7.9 (L) 01/20/2021 03:56 AM    Phosphorus 4.2 01/20/2021 03:56 AM        Gwendolyn Alexis M.D.   [facility]  Phone

## 2021-01-20 NOTE — PROGRESS NOTES
1930    Bedside, Verbal, and Written shift change report given to [address] [place] [address] (oncoming nurse) by RN (offgoing nurse). Report included the following information SBAR, Kardex, and MAR.     2000  Patient is in bed, alert and oriented. Room air, IV CDI, dressing CDI, no sign of distress. SCD contraindicated. Patient repositioned. Bed low, call bell within reach. 0000  Patient is in bed, alert and oriented. Room air, IV CDI, dressing CDI, no sign of distress. Patient repositioned. Urinal emptied. Bed low, call bell within reach. 0400  Patient is in bed, alert and oriented. Oxygen on/ Room air, IV CDI, dressing CDI, no sign of distress. Patient repositioned. Bed low, call bell within reach. 0500  Patient offered assistance with hygiene. Refused/ Changed gown, linen, underpad. 0600 Trash emptied, given water. Patient repositioned. No sign of distress. 0700  Patient is in bed resting/ sleeping. Patient has no complaint. 0730  Bedside, Verbal, and Written shift change report given to RN (oncoming nurse) by [address] [place] [address] (offgoing nurse). Report included the following information SBAR, Kardex, and MAR.

## 2021-01-20 NOTE — ROUTINE PROCESS
Bedside and Verbal shift change report given to ABHIJIT Mir (oncoming nurse) by Bonnie Madsen RN, BSN (offgoing nurse). Report included the following information SBAR, Kardex, ED Summary, Intake/Output, MAR and Recent Results.  
 
Bonnie Madsen RN, BSN

## 2021-01-20 NOTE — DISCHARGE SUMMARY
Internal Medicine Discharge Summary        Patient: Kylee Jaimes    YOB: 1960    Age:  63 y.o. Admit Date: 1/13/2021    Discharge Date: 1/20/2021    LOS:  LOS: 7 days     Discharge To: Home    Consults: Infectious Disease and Nephrology    Admission Diagnoses: Rhabdomyolysis [M62.82]    Discharge Diagnoses:    Problem List as of 1/20/2021 Never Reviewed          Codes Class Noted - Resolved    Gram-negative bacteremia ICD-10-CM: R78.81  ICD-9-CM: 790.7, 041.85  1/17/2021 - Present        Shigella gastroenteritis ICD-10-CM: A03.9  ICD-9-CM: 004.9  1/16/2021 - Present        Hypokalemia ICD-10-CM: E87.6  ICD-9-CM: 276.8  1/15/2021 - Present        DEONTE (acute kidney injury) ([facility] ICD-10-CM: N17.9  ICD-9-CM: 584.9  1/14/2021 - Present        HIV (human immunodeficiency virus infection) ([facility] ICD-10-CM: B20  ICD-9-CM: V08  1/14/2021 - Present        Diarrhea ICD-10-CM: R19.7  ICD-9-CM: 787.91  1/14/2021 - Present        * (Principal) Rhabdomyolysis ICD-10-CM: I25.58  ICD-9-CM: 728.88  1/13/2021 - Present              Discharge Condition:  Improved    Procedures: None         HPI: Kylee Jaimes is a 63 y.o.  male who presents with diarrhea and weakness. He has a hx hiv with compliance with haart. Was in usual state of health until Monday evening, started diarrhea, watery, severe. Pt has continued with this for last 2 days, also occasional vomiting, thought this was food poisoning. He is found to have rhabdo in ed, with deonte. Pt denies being down on floor, denies alcoholism, no new meds, no injury/trauma, no harsh workouts or marathons, no cocaine. He continues to have diarrhea.      We were asked to admit for work up and evaluation of the above problems.      Hospital Course:    Rhabdo/DEONTE  - continue IV fluids - switch to 1/2NS +K because Na trending up  - monitor BMP - Cr down slightly   - CK trending down as of 01/17 and back to WNL  - Cr holding steady around 4.3  - Trending down to 4.16 on 01/20 and tolerating PO diet  - appreciate nephrology  - OK to discharge, hold lisinopril and antiviral   - Needs follow up with his PCP for repeat BMP on Monday   - If renal fxn hasn't recovered to baseline in few weeks will need nephrology follow up     Shigella gastroenteritis, bacteremia  - cont IV levaquin  - ID consult - appreciate assistance  - 1/2 BCx resulted 1/15 with shigella as well  - repeat BCx 1/17 NGTD  - ID recommended levaquin PO until 01/28  - Stool now formed     HIV  - d/c genvoya due to YOLIE  - Cont to hold at discharge     Hypokalemia  - 2/2 diarrhea  - replace and recheck PRN    R foot neuropathy  - Etiology unclear at this time  - No trauma to the foot or leg  - Will need workup outpatient setting  - Nothing emergent at this time on evaluation (no swelling or redness) and only feels the pain/burning when he puts weight on foot  - PT/OT and crutches  - Further workup via PCP    The rest of the patient's chronic conditions were managed appropriately during their admission. They were medically stable at the time of discharge.     Visit Vitals  /83 (BP 1 Location: Left arm, BP Patient Position: At rest)   Pulse 66   Temp 98 °F (36.7 °C)   Resp 18   Ht 5' 9\" (1.753 m)   Wt 84.8 kg (187 lb)   SpO2 99%   BMI 27.62 kg/m²       Physical Exam at Discharge:  General Appearance: NAD, conversant  HENT: normocephalic/atraumatic, moist mucus membranes  Lungs: CTA with normal respiratory effort  CV: RRR, no m/r/g  Abdomen: soft, non-tender, normal bowel sounds  Extremities: no cyanosis, no peripheral edema  Neuro: moves all extremities, no focal deficits  Psych: appropriate affect, alert and oriented to person, place and time    Labs Prior to Discharge:  Labs: Results:       Chemistry Recent Labs     01/20/21  0356 01/19/21  0425 01/18/21  0415   GLU 88 77 82   * 148* 149*   K 3.6 3.9 3.8   * 112* 113*   CO2 23 24 25   BUN 43* 49* 50*   CREA 4.16* 4.35* 4.33*   CA 7. 9* 8.4* 8.6   AGAP 10 12 11   BUCR 10* 11* 12   ALB 2.0* 2.2* 2.2*      CBC w/Diff Recent Labs     01/18/21  0415   WBC 11.0   RBC 4.62*   HGB 14.5   HCT 43.1      GRANS 70   LYMPH 18*   EOS 0      Cardiac Enzymes No results for input(s): CPK, CKND1, ABBIE in the last 72 hours. No lab exists for component: CKRMB, TROIP   Coagulation No results for input(s): PTP, INR, APTT, INREXT in the last 72 hours. Lipid Panel No results found for: CHOL, CHOLPOCT, CHOLX, CHLST, CHOLV, 330726, HDL, HDLP, LDL, LDLC, DLDLP, 176441, VLDLC, VLDL, TGLX, TRIGL, TRIGP, TGLPOCT, CHHD, CHHDX   BNP No results for input(s): BNPP in the last 72 hours. Liver Enzymes Recent Labs     01/20/21  0356   ALB 2.0*      Thyroid Studies No results found for: T4, T3U, TSH, TSHEXT         Significant Imaging:  Ct Abd Pelv Wo Cont    Result Date: 1/13/2021  EXAM: CT ABD PELV WO CONT CLINICAL INDICATION/HISTORY:  diarrhea.   > Additional: None COMPARISON: None   > Correlative imaging: None. TECHNIQUE: Axial CT imaging of the abdomen and pelvis was performed without intravenous contrast. Multiplanar reformats were generated. One or more dose reduction techniques were used on this CT: automated exposure control, adjustment of the mAs and/or kVp according to patient size, and iterative reconstruction techniques. The specific techniques used on this CT exam have been documented in the patient's electronic medical record. Digital imaging and communications in medicine (DICOM) format image data are available to nonaffiliated external healthcare facilities or entities on a secure, media free, reciprocally searchable basis with patient authorization for at least a 12 month period after this study. _______________ FINDINGS: LOWER CHEST: Essentially clear. LIVER, BILIARY:   > Liver: Unremarkable.   > Biliary: Unremarkable. SPLEEN: Unremarkable. PANCREAS: Unremarkable. ADRENALS: Unremarkable. KIDNEYS/URETERS/BLADDER: Unremarkable.  PELVIC ORGANS: Moderate to marked prostate enlargement. GASTROINTESTINAL TRACT: There is possible/probable large bowel wall edema involving the descending, transverse and descending segments. However, the bowel is completely collapsed. There is relative prominence of small arcade vessels around the colon, but there is really little or no pericolonic fat stranding. Whether or not there is mild wall edema in the rectosigmoid is questionable. Small bowel is unremarkable. No evidence of obstruction. VASCULATURE: Unremarkable noncontrast evaluation. LYMPH NODES: No enlarged lymph nodes. OTHER: None. MUSCULOSKELETAL: Unremarkable. _______________     IMPRESSION: 1. Possible/probable colitis. Assuming that this is real, infectious etiology strongly favored over ischemic. 2. Moderate to marked prostate enlargement. Please note: Lack of intravenous contrast enhancement in the abdomen and pelvis may lead to: -- decreased sensitivity for detection of solid organ lesions or injury -- decreased ability to characterize solid organ lesions -- decreased sensitivity for detection and characterization of vascular pathology such as vessel dissection or thrombosis, intestinal ischemia, active hemorrhage or pseudoaneurysm -- decreased sensitivity for detection of acute inflammatory conditions     Xr Chest Port    Result Date: 1/14/2021  A. P. portable chest: Indication: Shortness of breath, concern for pneumonia. The lungs are clear. The heart and vascularity are normal.     Impression: Negative portable chest.           Discharge Medications:     Current Discharge Medication List      START taking these medications    Details   amLODIPine (NORVASC) 10 mg tablet Take 1 Tab by mouth daily. Qty: 30 Tab, Refills: 0      levoFLOXacin (LEVAQUIN) 500 mg tablet Take 1 Tab by mouth every fourty-eight (48) hours for 6 days. Qty: 3 Tab, Refills: 0      traMADoL (ULTRAM) 50 mg tablet Take 1 Tab by mouth every six (6) hours as needed for Pain for up to 3 days. Max Daily Amount: 200 mg. Indications: neuropathic pain  Qty: 12 Tab, Refills: 0    Associated Diagnoses: Neuropathic pain         CONTINUE these medications which have NOT CHANGED    Details   aspirin delayed-release 81 mg tablet Take  by mouth daily. cetirizine (ZYRTEC) 10 mg tablet Take 1 Tab by mouth daily. Qty: 10 Tab, Refills: 0      acetaminophen (TYLENOL EXTRA STRENGTH) 500 mg tablet Take 2 Tabs by mouth every six (6) hours as needed for Pain or Fever. Qty: 40 Tab, Refills: 0      rosuvastatin (Crestor) 5 mg tablet Take 5 mg by mouth nightly. guaiFENesin ER (MUCINEX) 600 mg ER tablet Take 1 Tab by mouth two (2) times a day. Qty: 20 Tab, Refills: 0         STOP taking these medications       lisinopriL (PRINIVIL, ZESTRIL) 20 mg tablet Comments:   Reason for Stopping:         elvitegravir-cobicistat-emtricitabine-tenofovir alafenamide (Genvoya) tab tablet Comments:   Reason for Stopping:               Activity: PT/OT Eval and Treat    Diet: Resume previous diet    Wound Care: None needed    Follow-up:   Please follow up with your PCP within 7 days to discuss your recent hospitalization and for follow up BMP. Patient to arrange.          Total time spent including time spent on final examination and discharge discussion, discharge documentation and records reviewed and medication reconciliation: > 30 minutes    Jamil Koo DO  Internal Medicine, Hospitalist  Pager: [phone]

## 2021-01-20 NOTE — ROUTINE PROCESS
0730:  Bedside and Verbal shift change report given to Tristian Vera RN (oncoming nurse) by Toña Andujar RN (offgoing nurse). Report included the following information SBAR, Kardex, MAR and Recent Results. 6545:  Administered due meds. 1100:  Patient watching tv, no concerns at this time. 1300:  Patient resting comfortably in bed. No concerns at this time. 1405:  Patient discharged off unit via wheelchair to the ER to his car.

## 2021-01-20 NOTE — PROGRESS NOTES
Problem: Mobility Impaired (Adult and Pediatric)  Goal: *Acute Goals and Plan of Care (Insert Text)  Description: Physical Therapy Goals  Initiated 1/20/2021 and to be accomplished within 7 day(s)  1. Patient will move from supine to sit and sit to supine  in bed with independence. 2.  Patient will transfer from bed to chair and chair to bed with independence using the least restrictive device. 3.  Patient will perform sit to stand with independence. 4.  Patient will ambulate with modified independence for 100 feet with the least restrictive device. 5.  Patient will ascend/descend 2 stairs with handrail(s) with modified independence. Outcome: Progressing Towards Goal     PHYSICAL THERAPY EVALUATION    Patient: Silvia Cantu (49 y.o. male)  Date: 1/20/2021  Primary Diagnosis: Rhabdomyolysis [M62.82]  Precautions:    PLOF: Indep  ASSESSMENT :  Physician requested physical therapy to provide patient with crutches along with education. Patient is supine in bed at beginning of evaluation. Patient is agreeable to physical therapy and demonstration with crutches. Supine to sit indep. Sit to stand from EOB to crutches indep. Patient c/o of sharp pain in R LE when putting weight through leg. Cannot maintain standing due to leg pain. Stand to sit from crutches to EOB done indep. No redness or warmth noted during palpation of R leg. Patient states it feels better to have his leg \"stretched out\". However, patient places leg on bed railing in knee flexion for pain relief. Declined to attempt walking with crutches d/t pain. Patient is appreciative of instruction with crutches and that he is OK to end session. Left on EOB seated with call bell in reach. Patient will benefit from skilled intervention to address the above impairments.   Patient's rehabilitation potential is considered to be Good  Factors which may influence rehabilitation potential include:   []         None noted  []         Mental ability/status  [x] Medical condition  []         Home/family situation and support systems  []         Safety awareness  []         Pain tolerance/management  []         Other:      PLAN :  Recommendations and Planned Interventions:   [x]           Bed Mobility Training             [x]    Neuromuscular Re-Education  [x]           Transfer Training                   []    Orthotic/Prosthetic Training  [x]           Gait Training                          []    Modalities  [x]           Therapeutic Exercises           []    Edema Management/Control  [x]           Therapeutic Activities            [x]    Family Training/Education  [x]           Patient Education  []           Other (comment):    Frequency/Duration: Patient will be followed by physical therapy 3 times a week to address goals. Discharge Recommendations: Outpatient  Further Equipment Recommendations for Discharge: crutches      SUBJECTIVE:   Patient stated It feels better if I stretch my leg out.     OBJECTIVE DATA SUMMARY:     Past Medical History:   Diagnosis Date    High cholesterol     HIV (human immunodeficiency virus infection) ([facility]    History reviewed. No pertinent surgical history. Barriers to Learning/Limitations: None  Compensate with: Visual Cues, Verbal Cues, Tactile Cues and Kinesthetic Cues    Home Situation:  Home Situation  Home Environment: Apartment  # Steps to Enter: 2  One/Two Story Residence: One story  Living Alone: No  Support Systems: Friends \ neighbors  Patient Expects to be Discharged to[de-identified] Apartment  Current DME Used/Available at Home: None    Critical Behavior:  Neurologic State: Agitated(d/t R leg pain)  Orientation Level: Oriented X4  Cognition: Follows commands     Psychosocial  Patient Behaviors: Anxious  Purposeful Interaction: Yes  Pt Identified Daily Priority: Clinical issues (comment)  Caritas Process: Nurture loving kindness;Enable judi/hope;Establish trust;Nurture spiritual self;Teaching/learning; Attend basic human needs;Create healing environment;Supportive expression;Creative use of self  Caring Interventions: Reassure; Therapeutic modalities  Reassure: Therapeutic listening; Informing; Acceptance;Caring rounds  Therapeutic Modalities: Humor; Intentional therapeutic touch; Visualization/imagery    Strength:    BL LE strength WFL  _________________________________________________     Range Of Motion:  BL LE AROM fucntional  Functional Mobility:  Bed Mobility:  Supine to Sit: Independent    Transfers:  Sit to Stand: Independent from EOB to crutches  Stand to Sit: Independent     Balance:   Sitting: Intact  Sitting - Static: Good (unsupported)  Sitting - Dynamic: Good (unsupported)  Standing: Impaired  Standing - Static: Fair  Standing - Dynamic : Fair      Pain:  Pain level pre-treatment: 9/10   Pain level post-treatment: 9/10     Activity Tolerance:   Fair    After treatment:   []         Patient left in no apparent distress sitting up in chair  []         Patient left in no apparent distress in bed  [x]         Call bell left within reach  [x]         Nursing notified  []         Caregiver present  []         Bed alarm activated  []         SCDs applied    COMMUNICATION/EDUCATION:   [x]         Role of physical therapy and plan of care in the acute care setting. [x]         Fall prevention education was provided and the patient/caregiver indicated understanding. [x]         Patient/family have participated as able in goal setting and plan of care. []         Patient/family agree to work toward stated goals and plan of care. []         Patient understands intent and goals of therapy, but is neutral about his/her participation. []         Patient is unable to participate in goal setting/plan of care: ongoing with therapy staff.     Thank you for this referral.  Maryjean Peabody, SPT   Time Calculation: 11 mins    Eval Complexity: History: LOW Complexity : Zero comorbidities / personal factors that will impact the outcome / POCExam:LOW Complexity : 1-2 Standardized tests and measures addressing body structure, function, activity limitation and / or participation in recreation  Presentation: LOW Complexity : Stable, uncomplicated  Clinical Decision Making:Low Complexity    Clinical judgement; ROM, MMT, functional mobility Overall Complexity:LOW

## 2021-01-20 NOTE — PROGRESS NOTES
Patient is unable to communicate at this time.  offered prayer by the bedside of patient. Chaplains will continue to follow and will provide pastoral care on an as needed/requested basis.     [facility]   Staff [address] [facility] [place]   [phone]

## 2021-01-20 NOTE — DISCHARGE INSTRUCTIONS
Patient armband removed and shredded  MyChart Activation    Thank you for requesting access to [employer]. Please follow the instructions below to securely access and download your online medical record. [employer] allows you to send messages to your doctor, view your test results, renew your prescriptions, schedule appointments, and more. How Do I Sign Up? 1. In your internet browser, go to [email or web address]  2. Click on the First Time User? Click Here link in the Sign In box. You will be redirect to the New Member Sign Up page. 3. Enter your [employer] Access Code exactly as it appears below. You will not need to use this code after youve completed the sign-up process. If you do not sign up before the expiration date, you must request a new code. Break30 Access Code: [email or web address]  Expires: 2021  4:14 PM (This is the date your Break30 access code will )    4. Enter the last four digits of your Social Security Number (xxxx) and Date of Birth (mm/dd/yyyy) as indicated and click Submit. You will be taken to the next sign-up page. 5. Create a Break30 ID. This will be your Break30 login ID and cannot be changed, so think of one that is secure and easy to remember. 6. Create a Break30 password. You can change your password at any time. 7. Enter your Password Reset Question and Answer. This can be used at a later time if you forget your password. 8. Enter your e-mail address. You will receive e-mail notification when new information is available in [address] 9. Click Sign Up. You can now view and download portions of your medical record. 10. Click the Download Summary menu link to download a portable copy of your medical information. Additional Information    If you have questions, please visit the Frequently Asked Questions section of the [employer] website at [email or web address] Remember, [employer] is NOT to be used for urgent needs.  For medical emergencies, dial 911.

## 2021-01-20 NOTE — PROGRESS NOTES
Pt asking for crutches placed order per dr Lorne Concepcion, called spoke with [facility] they will bring pt crutches, show him how to use. Pt dc'd home, no services. Care Management Interventions  PCP Verified by CM: Yes  Palliative Care Criteria Met (RRAT>21 & CHF Dx)?: No  Mode of Transport at Discharge:  Other (see comment)  Transition of Care Consult (CM Consult): Discharge Planning  Discharge Durable Medical Equipment: No  Physical Therapy Consult: No  Occupational Therapy Consult: No  Speech Therapy Consult: No  Confirm Follow Up Transport: Self  The Patient and/or Patient Representative was Provided with a Choice of Provider and Agrees with the Discharge Plan?: No  Freedom of Choice List was Provided with Basic Dialogue that Supports the Patient's Individualized Plan of Care/Goals, Treatment Preferences and Shares the Quality Data Associated with the Providers?: No  Discharge Location  Discharge Placement: Home

## 2021-01-20 NOTE — MED STUDENT NOTES
[facility] 
(A Division of [address] [place] [address]) Follow-up Note *ATTENTION:  This note has been created by a medical student for educational purposes only. Please do not refer to the content of this note for clinical decision-making, billing, or other purposes. Please see attending physicians note to obtain clinical information on this patient. * Date of Admission: 1/13/2021       Date of Note:  1/20/2021 Summary:   
63 y.o. male with  HIV, admitted 1/13 with severe, watery diarrhea and generalized weakness, as well as some vomiting. He works as a  and began having diarrhea on the evening of 1/11. He had one episode of vomiting and and came to the ED on 1/13. Found to have fever 102.7, rhabdomyolysis and YOLIE, he was started on IV fluids and admitted. Stool enteric panel was positive for Shigella and he was started on Zosyn and Levofloxacin. Zosyn was d/c'ed on 1/18 and now only on Levofloxacin. Interval History: 
Patient states that he feels much better. He is no longer having any diarrhea, last episode of diarrhea was yesterday. He does report some RLE pain which started 2 days ago when he ambulates to use the commode, but not at rest in bed. He denies any leg swelling, fevers, chills, N/V/D, or SOB. No other acute concerns. Current Antimicrobials:                                                   Prior Antimicrobials: 
Levofloxacin 1/14 - 7  Zosyn 1/14 - 4  
  
  
Assessment: Rec / Plan:  
Acute Shigella enterocolitis  
- stool enteric panel positive for Shigella, negative for Shigatoxin 
- improving on abx rx -> continue PO Levofloxacin q 48 hours 
-> OK to discharge from ID standpoint when renal function is improved to continue PO levofloxacin until 1/28 BSI Shigella 
- blcx 1/14 1 of 2 + Shigella flexneri sensitive to quinolones 
- blcx 1/17 2/2 no growth at 3 days -> abx as above YOLIE - Cr down-trending, at 4.16 currently -> per Renal  
Rhabdomyolysis - Resolving, CK 13K ->-> 339  -> monitor HIV 
- followed by Dr. Cory Paz -> hold ARV until renal function improves  
  
 
Microbiology: As above Lines / Catheters: 
PIV (right antecubital) PIV (right posterior hand) Patient Active Problem List  
Diagnosis Code  Rhabdomyolysis M62.82  
 YOLIE (acute kidney injury) ([facility] N17.9  
 HIV [facility] immunodeficiency virus [facility] ([facility] B20  
 Diarrhea R19.7  Hypokalemia E87.6  Shigella gastroenteritis A03.9  Gram-negative bacteremia R78.81 Current Facility-Administered Medications Medication Dose Route Frequency  [START ON 2021] amLODIPine (NORVASC) tablet 10 mg  10 mg Oral DAILY  levoFLOXacin (LEVAQUIN) tablet 500 mg  500 mg Oral Q48H  
 traMADoL (ULTRAM) tablet 50 mg  50 mg Oral Q6H PRN  
 naloxone (NARCAN) injection 0.4 mg  0.4 mg IntraVENous PRN  
 enoxaparin (LOVENOX) injection 30 mg  30 mg SubCUTAneous Q24H  
 aspirin delayed-release tablet 81 mg  81 mg Oral DAILY  sodium chloride (NS) flush 5-40 mL  5-40 mL IntraVENous Q8H  
 sodium chloride (NS) flush 5-40 mL  5-40 mL IntraVENous PRN  
 acetaminophen (TYLENOL) tablet 650 mg  650 mg Oral Q6H PRN Or  
 acetaminophen (TYLENOL) suppository 650 mg  650 mg Rectal Q6H PRN  polyethylene glycol (MIRALAX) packet 17 g  17 g Oral DAILY PRN  promethazine (PHENERGAN) tablet 12.5 mg  12.5 mg Oral Q6H PRN Or  
 ondansetron (ZOFRAN) injection 4 mg  4 mg IntraVENous Q6H PRN Review of Systems - Negative except as noted in interval history. Objective: 
 
Visit Vitals BP (!) 135/92 (BP 1 Location: Left arm, BP Patient Position: At rest) Pulse 67 Temp 98.5 °F (36.9 °C) Resp 18 Ht 5' 9\" (1.753 m) Wt 84.8 kg (187 lb) SpO2 97% BMI 27.62 kg/m² Temp (24hrs), Av.6 °F (37 °C), Min:98.2 °F (36.8 °C), Max:99.3 °F (37.4 °C) General: Well developed, well nourished 63 y.o. BLACK OR  male in no acute distress. ENT: Throat normal without erythema or exudate Head: normocephalic, without obvious abnormality, atraumatic Mouth:  mucous membranes moist, pharynx normal without lesions Neck: supple, symmetrical, trachea midline Cardio:  regular rate and rhythm, S1, S2 normal, no murmur, click, rub or gallop Chest: inspection normal - no chest wall deformities or tenderness, respiratory effort normal, symmetric Lungs: clear to auscultation, no wheezes or rales and unlabored breathing Abdomen: Normoactive bowel sounds. Soft, non-tender. No distension or guarding. No masses, no organomegaly. Extremities:  No redness or tenderness in the calves or thighs, no edema. Neuro: Alert and interactive, answers all questions appropriately; motor and sensory grossly intact. Lab results: 
 
Chemistry Recent Labs  
  01/20/21 
0356 01/19/21 
0425 01/18/21 
0415 GLU 88 77 82 * 148* 149*  
K 3.6 3.9 3.8 * 112* 113* CO2 23 24 25 BUN 43* 49* 50* CREA 4.16* 4.35* 4.33* CA 7.9* 8.4* 8.6 AGAP 10 12 11 BUCR 10* 11* 12 ALB 2.0* 2.2* 2.2*  
 
 
CBC w/ Diff Recent Labs  
  01/18/21 
0415 WBC 11.0  
RBC 4.62* HGB 14.5 HCT 43.1  GRANS 70 LYMPH 18* EOS 0 Microbiology All Micro Results Procedure Component Value Units Date/Time CULTURE, BLOOD [[id number]] Collected: 01/17/21 0854 Order Status: Completed Specimen: Blood Updated: 01/20/21 6235 Special Requests: RIGHT ARM Culture result: NO GROWTH 3 DAYS     
 CULTURE, BLOOD [[id number]] Collected: 01/17/21 0854 Order Status: Completed Specimen: Blood Updated: 01/20/21 4945 Special Requests: LEFT ARM Culture result: NO GROWTH 3 DAYS     
 CULTURE, BLOOD [[id number]] Collected: 01/14/21 0015 Order Status: Completed Specimen: Blood Updated: 01/20/21 3004 Special Requests: NO SPECIAL REQUESTS Culture result: NO GROWTH 6 DAYS     
 OVA & PARASITES, STOOL [[id number]] Collected: 01/14/21 1730 Order Status: Completed Specimen: Feces from Stool Updated: 01/19/21 1136 Source STOOL Ova & Parasite exam Final Report Below Comment: (NOTE) These results were obtained using wet preparation(s) and trichrome 
stained smear. This test does not include testing for Cryptosporidium parvum, Cyclospora, or Microsporidia. Performed At: [address], [address] [place] [address] Lorraine Davies MD AK:[id number] CULTURE, BLOOD [[id number]]  (Abnormal)  (Susceptibility) Collected: 01/14/21 0017 Order Status: Completed Specimen: Blood Updated: 01/18/21 1302 Special Requests: NO SPECIAL REQUESTS     
  GRAM STAIN GRAM NEGATIVE RODS PEDS SMEAR CALLED TO AND CORRECTLY REPEATED BY: [id number] AT 3900,34/13/01 TO ETT. Culture result: SHIGELLA SPECIES TYPING AS SHIGELLA FLEXNERI GROWING IN THE PEDIATRIC BOTTLE (SITE = L. ARM) Reported to Dept of Health ENTERIC BACTERIA PANEL, DNA [[id number]]  (Abnormal) Collected: 01/14/21 1730 Order Status: Completed Specimen: Stool Updated: 01/16/21 1314 Shigella species, DNA Positive Campylobacter species, DNA Negative Vibrio species, DNA Negative Enterotoxigen E Coli, DNA Negative Shiga toxin producing, DNA Negative Salmonella species, DNA Negative P. shigelloides, DNA Negative Y. enterocolitica, DNA Negative CULTURE, URINE [[id number]] Collected: 01/13/21 2040 Order Status: Completed Specimen: Urine from Clean catch Updated: 01/15/21 1325 Special Requests: NO SPECIAL REQUESTS Culture result: No growth (<1,000 CFU/ML) C. DIFFICILE AG & TOXIN A/B [[id number]] Collected: 01/13/21 2040 Order Status: Completed Specimen: Stool Updated: 01/14/21 5691 [address] [place] ANTIGEN Negative C. difficile toxin Negative INTERPRETATION    
  NEGATIVE FOR TOXIGENIC C. DIFFICILE Theodore Fofana  
1/20/2021  
11:30 AM

## 2021-01-22 LAB
BACTERIA SPEC CULT: NORMAL
BACTERIA SPEC CULT: NORMAL
GRAM STN SPEC: NORMAL
GRAM STN SPEC: NORMAL
SERVICE CMNT-IMP: NORMAL

## 2021-01-23 LAB
BACTERIA SPEC CULT: NORMAL
BACTERIA SPEC CULT: NORMAL
SERVICE CMNT-IMP: NORMAL
SERVICE CMNT-IMP: NORMAL

## 2021-01-28 LAB
REFERENCE LAB,REFLB: NORMAL
TEST DESCRIPTION:,ATST: NORMAL

## 2022-03-18 PROBLEM — B20 HIV (HUMAN IMMUNODEFICIENCY VIRUS INFECTION) (HCC): Status: ACTIVE | Noted: 2021-01-14

## 2022-03-18 PROBLEM — Z21 HIV (HUMAN IMMUNODEFICIENCY VIRUS INFECTION) (HCC): Status: ACTIVE | Noted: 2021-01-14

## 2022-03-19 PROBLEM — R78.81 GRAM-NEGATIVE BACTEREMIA: Status: ACTIVE | Noted: 2021-01-17

## 2022-03-19 PROBLEM — N17.9 AKI (ACUTE KIDNEY INJURY) (HCC): Status: ACTIVE | Noted: 2021-01-14

## 2022-03-19 PROBLEM — R19.7 DIARRHEA: Status: ACTIVE | Noted: 2021-01-14

## 2022-03-19 PROBLEM — A03.9 SHIGELLA GASTROENTERITIS: Status: ACTIVE | Noted: 2021-01-16

## 2022-03-20 PROBLEM — M62.82 RHABDOMYOLYSIS: Status: ACTIVE | Noted: 2021-01-13

## 2022-03-20 PROBLEM — E87.6 HYPOKALEMIA: Status: ACTIVE | Noted: 2021-01-15

## 2022-09-12 ENCOUNTER — HOSPITAL ENCOUNTER (OUTPATIENT)
Dept: LAB | Age: 62
Discharge: HOME | End: 2022-09-12

## 2022-09-12 LAB — SENTARA SPECIMEN COL,SENBCF: NORMAL

## 2022-09-12 PROCEDURE — 99001 SPECIMEN HANDLING PT-LAB: CPT

## 2023-10-03 PROBLEM — R03.0 ELEVATED BLOOD-PRESSURE READING WITHOUT DIAGNOSIS OF HYPERTENSION: Status: ACTIVE | Noted: 2019-07-22

## 2023-10-03 PROBLEM — R07.9 CHEST PAIN: Status: ACTIVE | Noted: 2023-10-03

## 2023-10-03 PROBLEM — R97.20 HIGH PROSTATE SPECIFIC ANTIGEN (PSA): Status: ACTIVE | Noted: 2022-11-11

## 2023-10-03 PROBLEM — R05.9 COUGH: Status: ACTIVE | Noted: 2023-10-03

## 2023-11-02 PROBLEM — R05.9 COUGH: Status: RESOLVED | Noted: 2023-10-03 | Resolved: 2023-11-02

## 2024-07-18 ENCOUNTER — OFFICE VISIT (OUTPATIENT)
Age: 64
End: 2024-07-18
Payer: COMMERCIAL

## 2024-07-18 VITALS
TEMPERATURE: 97.5 F | WEIGHT: 142 LBS | BODY MASS INDEX: 21.03 KG/M2 | HEIGHT: 69 IN | HEART RATE: 48 BPM | DIASTOLIC BLOOD PRESSURE: 60 MMHG | SYSTOLIC BLOOD PRESSURE: 111 MMHG | OXYGEN SATURATION: 98 %

## 2024-07-18 DIAGNOSIS — Z21 ASYMPTOMATIC HIV INFECTION, WITH NO HISTORY OF HIV-RELATED ILLNESS (HCC): ICD-10-CM

## 2024-07-18 DIAGNOSIS — R01.1 SYSTOLIC MURMUR: ICD-10-CM

## 2024-07-18 DIAGNOSIS — E78.00 HYPERCHOLESTEREMIA: ICD-10-CM

## 2024-07-18 DIAGNOSIS — I10 PRIMARY HYPERTENSION: Primary | ICD-10-CM

## 2024-07-18 DIAGNOSIS — R94.31 ABNORMAL ECG: ICD-10-CM

## 2024-07-18 DIAGNOSIS — I51.89 DIASTOLIC DYSFUNCTION: ICD-10-CM

## 2024-07-18 DIAGNOSIS — I51.7 LVH (LEFT VENTRICULAR HYPERTROPHY): ICD-10-CM

## 2024-07-18 PROCEDURE — 99214 OFFICE O/P EST MOD 30 MIN: CPT | Performed by: INTERNAL MEDICINE

## 2024-07-18 PROCEDURE — 3074F SYST BP LT 130 MM HG: CPT | Performed by: INTERNAL MEDICINE

## 2024-07-18 PROCEDURE — 3078F DIAST BP <80 MM HG: CPT | Performed by: INTERNAL MEDICINE

## 2024-07-18 RX ORDER — CARVEDILOL 12.5 MG/1
12.5 TABLET ORAL 2 TIMES DAILY
COMMUNITY
Start: 2024-05-10

## 2024-07-18 RX ORDER — HYDRALAZINE HYDROCHLORIDE 50 MG/1
50 TABLET, FILM COATED ORAL 2 TIMES DAILY
COMMUNITY
Start: 2024-05-10

## 2024-07-18 RX ORDER — AMLODIPINE BESYLATE 10 MG/1
10 TABLET ORAL DAILY
COMMUNITY
Start: 2024-07-09

## 2024-07-18 RX ORDER — LAMIVUDINE 100 MG/1
50 TABLET, FILM COATED ORAL DAILY
COMMUNITY
Start: 2024-07-01

## 2024-07-18 RX ORDER — METOLAZONE 5 MG/1
5 TABLET ORAL DAILY
COMMUNITY
Start: 2024-06-30

## 2024-07-18 RX ORDER — SODIUM BICARBONATE 650 MG/1
1950 TABLET ORAL 3 TIMES DAILY
COMMUNITY

## 2024-07-18 RX ORDER — METOPROLOL SUCCINATE 100 MG/1
100 TABLET, EXTENDED RELEASE ORAL NIGHTLY
COMMUNITY

## 2024-07-18 RX ORDER — MIRTAZAPINE 15 MG/1
15 TABLET, FILM COATED ORAL DAILY
COMMUNITY
Start: 2024-05-31

## 2024-07-18 RX ORDER — FUROSEMIDE 80 MG
80 TABLET ORAL 2 TIMES DAILY
COMMUNITY

## 2024-07-18 RX ORDER — PANTOPRAZOLE SODIUM 40 MG/1
1 TABLET, DELAYED RELEASE ORAL DAILY
COMMUNITY

## 2024-07-18 RX ORDER — RALTEGRAVIR 400 MG/1
TABLET, FILM COATED ORAL
COMMUNITY
Start: 2024-06-20

## 2024-07-18 RX ORDER — ONDANSETRON 4 MG/1
TABLET, ORALLY DISINTEGRATING ORAL
COMMUNITY

## 2024-07-18 ASSESSMENT — ENCOUNTER SYMPTOMS
ABDOMINAL DISTENTION: 1
SHORTNESS OF BREATH: 0
ALLERGIC/IMMUNOLOGIC NEGATIVE: 1
ABDOMINAL PAIN: 1
EYES NEGATIVE: 1

## 2024-07-18 ASSESSMENT — PATIENT HEALTH QUESTIONNAIRE - PHQ9
SUM OF ALL RESPONSES TO PHQ9 QUESTIONS 1 & 2: 0
SUM OF ALL RESPONSES TO PHQ QUESTIONS 1-9: 0
SUM OF ALL RESPONSES TO PHQ QUESTIONS 1-9: 0
1. LITTLE INTEREST OR PLEASURE IN DOING THINGS: NOT AT ALL
SUM OF ALL RESPONSES TO PHQ QUESTIONS 1-9: 0
SUM OF ALL RESPONSES TO PHQ QUESTIONS 1-9: 0
2. FEELING DOWN, DEPRESSED OR HOPELESS: NOT AT ALL

## 2024-07-18 NOTE — PROGRESS NOTES
1. \"Have you been to the ER, urgent care clinic since your last visit?  Hospitalized since your last visit?\" Reviewed by Dr. Brennan Crow    2. \"Have you seen or consulted any other health care providers outside of the [facility] since your last visit?\" Reviewed by Dr. Brennan Crow   
  Abdominal:      General: Abdomen is flat. Bowel sounds are normal.      Palpations: Abdomen is soft.   Musculoskeletal:         General: Normal range of motion.      Cervical back: Normal range of motion and neck supple.      Right lower leg: No edema.      Left lower leg: No edema.   Lymphadenopathy:      Cervical: No cervical adenopathy.   Skin:     General: Skin is warm and dry.      Capillary Refill: Capillary refill takes 2 to 3 seconds.   Neurological:      General: No focal deficit present.      Mental Status: He is alert and oriented to person, place, and time.   Psychiatric:         Mood and Affect: Mood normal.       ASSESSMENT/PLAN:  1. Primary hypertension  2. Diastolic dysfunction  3. Abnormal ECG  4. Systolic murmur  5. Hypercholesteremia  6. LVH (left ventricular hypertrophy)  7. Asymptomatic HIV infection, with no history of HIV-related illness (HCC)    Patient has hypertension well-controlled.  No change in therapy at this time.  He does have evidence of left ventricular pressure be confirmed on recent echocardiogram.  Asymptomatic from that perspective.  No new recommendations will be made.  I have asked him to increase physical activity however.    No results found for any visits on 07/18/24.     Return in about 1 year (around 7/18/2025) for Follow up with echo.    On this date 7/18/2024 I have spent 31 minutes reviewing previous notes, test results and face to face with the patient discussing the diagnosis and importance of compliance with the treatment plan as well as documenting on the day of the visit.      An electronic signature was used to authenticate this note.    --Brennan Crow MD

## 2025-06-23 DIAGNOSIS — R01.1 SYSTOLIC MURMUR: ICD-10-CM

## 2025-06-23 DIAGNOSIS — I51.89 DIASTOLIC DYSFUNCTION: ICD-10-CM

## 2025-06-23 DIAGNOSIS — I10 PRIMARY HYPERTENSION: Primary | ICD-10-CM

## 2025-06-23 DIAGNOSIS — I51.7 LVH (LEFT VENTRICULAR HYPERTROPHY): ICD-10-CM

## 2025-07-21 ENCOUNTER — OFFICE VISIT (OUTPATIENT)
Age: 65
End: 2025-07-21
Payer: MEDICARE

## 2025-07-21 VITALS — OXYGEN SATURATION: 98 % | SYSTOLIC BLOOD PRESSURE: 160 MMHG | HEART RATE: 50 BPM | DIASTOLIC BLOOD PRESSURE: 80 MMHG

## 2025-07-21 DIAGNOSIS — R94.31 ABNORMAL ECG: ICD-10-CM

## 2025-07-21 DIAGNOSIS — R01.1 SYSTOLIC MURMUR: ICD-10-CM

## 2025-07-21 DIAGNOSIS — Z21 ASYMPTOMATIC HIV INFECTION, WITH NO HISTORY OF HIV-RELATED ILLNESS (HCC): ICD-10-CM

## 2025-07-21 DIAGNOSIS — I51.89 DIASTOLIC DYSFUNCTION: ICD-10-CM

## 2025-07-21 DIAGNOSIS — I51.7 LVH (LEFT VENTRICULAR HYPERTROPHY): ICD-10-CM

## 2025-07-21 DIAGNOSIS — I10 PRIMARY HYPERTENSION: Primary | ICD-10-CM

## 2025-07-21 DIAGNOSIS — Z99.2 ESRD (END STAGE RENAL DISEASE) ON DIALYSIS (HCC): ICD-10-CM

## 2025-07-21 DIAGNOSIS — E78.00 HYPERCHOLESTEREMIA: ICD-10-CM

## 2025-07-21 DIAGNOSIS — N18.6 ESRD (END STAGE RENAL DISEASE) ON DIALYSIS (HCC): ICD-10-CM

## 2025-07-21 PROCEDURE — 3077F SYST BP >= 140 MM HG: CPT | Performed by: INTERNAL MEDICINE

## 2025-07-21 PROCEDURE — 3079F DIAST BP 80-89 MM HG: CPT | Performed by: INTERNAL MEDICINE

## 2025-07-21 PROCEDURE — 3017F COLORECTAL CA SCREEN DOC REV: CPT | Performed by: INTERNAL MEDICINE

## 2025-07-21 PROCEDURE — 1036F TOBACCO NON-USER: CPT | Performed by: INTERNAL MEDICINE

## 2025-07-21 PROCEDURE — 1123F ACP DISCUSS/DSCN MKR DOCD: CPT | Performed by: INTERNAL MEDICINE

## 2025-07-21 PROCEDURE — 99215 OFFICE O/P EST HI 40 MIN: CPT | Performed by: INTERNAL MEDICINE

## 2025-07-21 PROCEDURE — G8420 CALC BMI NORM PARAMETERS: HCPCS | Performed by: INTERNAL MEDICINE

## 2025-07-21 PROCEDURE — G8428 CUR MEDS NOT DOCUMENT: HCPCS | Performed by: INTERNAL MEDICINE

## 2025-07-21 RX ORDER — DOXEPIN HYDROCHLORIDE 50 MG/G
CREAM TOPICAL
COMMUNITY
Start: 2024-12-11

## 2025-07-21 RX ORDER — ERGOCALCIFEROL 1.25 MG/1
CAPSULE, LIQUID FILLED ORAL
COMMUNITY
Start: 2025-06-13

## 2025-07-21 RX ORDER — SUCROFERRIC OXYHYDROXIDE 500 MG/1
TABLET, CHEWABLE ORAL
COMMUNITY
Start: 2025-01-14

## 2025-07-21 RX ORDER — KETOCONAZOLE 20 MG/G
CREAM TOPICAL
COMMUNITY

## 2025-07-21 ASSESSMENT — ENCOUNTER SYMPTOMS
SHORTNESS OF BREATH: 0
GASTROINTESTINAL NEGATIVE: 1
EYES NEGATIVE: 1
ALLERGIC/IMMUNOLOGIC NEGATIVE: 1

## 2025-07-21 ASSESSMENT — PATIENT HEALTH QUESTIONNAIRE - PHQ9
SUM OF ALL RESPONSES TO PHQ QUESTIONS 1-9: 0
1. LITTLE INTEREST OR PLEASURE IN DOING THINGS: NOT AT ALL
2. FEELING DOWN, DEPRESSED OR HOPELESS: NOT AT ALL

## 2025-07-21 NOTE — PROGRESS NOTES
Colin Carcamo (:  1960) is a 64 y.o. male,Established patient, here for evaluation of the following chief complaint(s):  1 Year Follow Up      Subjective   SUBJECTIVE/OBJECTIVE:  History of Present Illness  Patient presents today for follow-up. He has a history of pre-hypertension and hyperlipidemia. Patient has had chest discomfort which has since resolved; it would occur intermittently, but not daily. He may have an episode once a week or every other week and then not have an episode for months. Patient came here where he was seen and evaluated. Nuclear stress test was mildly abnormal which led to a cardiac catheterization which demonstrated no evidence of significant coronary disease. He did have mild disease present, but not enough that required intervention.       He is currently undergoing dialysis 3 days a week, which he reports is progressing well without any complications. His blood pressure tends to fluctuate during dialysis, with readings as low as 113/67. He has noticed a breakout, which he suspects might be due to his vitamin D intake.    He has a long-standing low-grade tremor in his hand, which he does not find bothersome.    SOCIAL HISTORY  Exercise: Walks to the car.    I have carefully reviewed all available medical records, previous office notes, lab, x-ray and procedure reports    Past Medical History:   Diagnosis Date    Dialysis patient 10/15/2024    High cholesterol     HIV (human immunodeficiency virus infection) (HCC)     Hypertension         Past Surgical History:   Procedure Laterality Date    COLONOSCOPY      10 year schedule.     OTHER SURGICAL HISTORY      Age 4- Open heart surgery to close hole in heart.     UROLOGICAL SURGERY  2022    TRANSRECTAL ULTRASOUND AND BIOPSY OF PROSTATE URONAV; Clemente Burton        No Known Allergies     Current Outpatient Medications   Medication Sig Dispense Refill    doxepin (ZONALON) 5 % cream       vitamin D (ERGOCALCIFEROL) 1.25 MG

## 2025-07-21 NOTE — PROGRESS NOTES
1. \"Have you been to the ER, urgent care clinic since your last visit?  Hospitalized since your last visit?\" Reviewed by Dr. Brennan Crow    2. \"Have you seen or consulted any other health care providers outside of the [facility] since your last visit?\" Reviewed by Dr. Brennan Crow